# Patient Record
Sex: MALE | Race: WHITE | Employment: FULL TIME | URBAN - METROPOLITAN AREA
[De-identification: names, ages, dates, MRNs, and addresses within clinical notes are randomized per-mention and may not be internally consistent; named-entity substitution may affect disease eponyms.]

---

## 2019-03-18 ENCOUNTER — OFFICE VISIT (OUTPATIENT)
Dept: FAMILY MEDICINE CLINIC | Age: 54
End: 2019-03-18

## 2019-03-18 VITALS
DIASTOLIC BLOOD PRESSURE: 79 MMHG | BODY MASS INDEX: 31.79 KG/M2 | WEIGHT: 186.2 LBS | RESPIRATION RATE: 18 BRPM | HEIGHT: 64 IN | TEMPERATURE: 98 F | SYSTOLIC BLOOD PRESSURE: 126 MMHG | HEART RATE: 78 BPM | OXYGEN SATURATION: 100 %

## 2019-03-18 DIAGNOSIS — R06.83 SNORING: ICD-10-CM

## 2019-03-18 DIAGNOSIS — Z12.5 SCREENING FOR PROSTATE CANCER: ICD-10-CM

## 2019-03-18 DIAGNOSIS — E78.00 HYPERCHOLESTEROLEMIA: ICD-10-CM

## 2019-03-18 DIAGNOSIS — Z11.59 ENCOUNTER FOR HEPATITIS C SCREENING TEST FOR LOW RISK PATIENT: ICD-10-CM

## 2019-03-18 DIAGNOSIS — K63.5 POLYP OF COLON, UNSPECIFIED PART OF COLON, UNSPECIFIED TYPE: ICD-10-CM

## 2019-03-18 DIAGNOSIS — I10 ESSENTIAL HYPERTENSION: Primary | ICD-10-CM

## 2019-03-18 NOTE — PROGRESS NOTES
Carey Taylor is a 52 y.o. male     Fasting  Patient would like a cpe and referral to have a sleep study test performed. Patient states that he feels as tho he's not getting enough sleep at night. Chief Complaint   Patient presents with    New Patient    Establish Care    Hypertension       1. Have you been to the ER, urgent care clinic since your last visit? Hospitalized since your last visit? No  M  2. Have you seen or consulted any other health care providers outside of the [address] [place] [address] since your last visit? Include any pap smears or colon screening. No      Visit Vitals  Ht 5' 4\" (1.626 m)   Wt 186 lb 3.2 oz (84.5 kg)   BMI 31.96 kg/m²           Health Maintenance Due   Topic Date Due    Hepatitis C Screening  1965    DTaP/Tdap/Td series (1 - Tdap) 10/13/1984    Shingrix Vaccine Age 50> (1 of 2) 10/13/2013    Influenza Age 5 to Adult  08/01/2018         Medication Reconciliation completed, changes noted.   Please  Update medication list.

## 2019-03-18 NOTE — PATIENT INSTRUCTIONS
Blood pressure is very well controlled today, will check labs  History of hyperlipidemia, has historically been intolerant of statin medications, we will check labs and determine next course of action  Screening for hepatitis C and PSA and blood work  Patient has daytime fatigue, nonrestorative sleep, and snoring, I will make him a referral to sleep medicine for consultation and evaluation  History of colon polyps, up-to-date right now on colonoscopy, will be due again in 2023  I have encouraged the patient to follow a healthy diet and work to maintain a healthy weight, he is going to try to exercise more frequently.   Labs as per orders, we will see him back in 6 months or sooner as needed

## 2019-03-18 NOTE — PROGRESS NOTES
Family Medicine Initial Office Visit  Patient: Ana Cristina Maya  1965, 52 y.o., male  Encounter Date: 3/18/2019    ASSESSMENT & PLAN    ICD-10-CM ICD-9-CM    1. Essential hypertension I10 401.9 CBC W/O DIFF      METABOLIC PANEL, COMPREHENSIVE   2. Polyp of colon, unspecified part of colon, unspecified type K63.5 211.3    3. Hypercholesterolemia E78.00 272.0 CBC W/O DIFF      LIPID PANEL      METABOLIC PANEL, COMPREHENSIVE   4. Encounter for hepatitis C screening test for low risk patient Z11.59 V73.89 HCV AB W/RFLX TO TREVER   5. Snoring R06.83 786.09 SLEEP MEDICINE REFERRAL   6. Screening for prostate cancer Z12.5 V76.44 PSA SCREENING (SCREENING)     Orders Placed This Encounter    CBC W/O DIFF    LIPID PANEL    METABOLIC PANEL, COMPREHENSIVE    HCV AB W/RFLX TO TREVER    PSA SCREENING (SCREENING)    SLEEP MEDICINE REFERRAL     Referral Priority:   Routine     Referral Type:   Consultation     Referral Reason:   Specialty Services Required     Referred to Provider:   Shaheen Guevara MD     Requested Specialty:   Sleep Medicine     Number of Visits Requested:   1   Blood pressure is very well controlled today, will check labs  History of hyperlipidemia, has historically been intolerant of statin medications, we will check labs and determine next course of action  Screening for hepatitis C and PSA and blood work  Patient has daytime fatigue, nonrestorative sleep, and snoring, I will make him a referral to sleep medicine for consultation and evaluation  History of colon polyps, up-to-date right now on colonoscopy, will be due again in 2023  I have encouraged the patient to follow a healthy diet and work to maintain a healthy weight, he is going to try to exercise more frequently.   Labs as per orders, we will see him back in 6 months or sooner as needed    CHIEF COMPLAINT  Chief Complaint   Patient presents with    New Patient   [address]    Hypertension       SUBJECTIVE  Ana Cristina Maya is a 52 y.o. male presenting today for establishing care. Prior pt of Dr Albert Conklin, hasn't been seen in almost 5 years  Patient works as   Patient lives in a house with son (29). Has one other child who lives with her mother. Patient was last seen by primary care 4-5 years ago  Patient last saw a dentist about 4 months ago  Patient last had eye exam within the last year    Exercise: \"somewhat\" he enjoys back packing and hiking. No deliberate exercise on a weekly basis, but he has a very physically demanding jobs  Diet / Weight: not deliberately eating a healthy diet, reports \"I just eat what I feel like eating. \" He does eat vegetables and fruits. Weight is staying the same. Tobacco: quit in 1997, smoked for 30 years, 3 ppd  EtOH: no  Illicit Substances: no    Sexual Activity: not sexually active, does not desire std testing    The patient reports he has non-restorative sleep. When he wakes up he feels just as tired as when he went to bed. Feels his sleep can be restless, has chronic back pain but reports that he doesn't think back is his problem. He reports that some of his friends at work had great success with getting sleep difficulties identified on sleep studies. Patient does snore but does not wake from sleep gasping. He does wear a  because he grinds his teeth at night. Last colonoscopy done with [facility] in 2018, two sessile polyps, due again in 2023. Has not had lab work for a few years. Had Keratosis on hands and face--Follows with Dr Hao Donahue at [facility] for dermatology--just seen last week. 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 Suspicious for ca, Followed up with Dr Lucas May at [facility], was recommended to have a Whipple procedure, there was a change in plans, had cholecystectomy and continued surveillance. Patient has not continued surveillance.   Brother has a hx of Factor V leiden deficiency, patient has not personally had any blood clots    Review of Systems   Constitutional: Negative for chills and fever. HENT: Negative. Eyes: Negative for visual disturbance. Respiratory: Negative for shortness of breath. Cardiovascular: Negative for chest pain and leg swelling. Gastrointestinal: Negative for constipation, diarrhea, nausea and vomiting. Genitourinary: Negative for difficulty urinating. Musculoskeletal: Negative for arthralgias and myalgias. Skin: Negative for rash. Neurological: Negative for seizures, syncope and headaches. Psychiatric/Behavioral:        At Baseline, stable   All other systems reviewed and are negative. OBJECTIVE  Visit Vitals  /79 (BP 1 Location: Left arm, BP Patient Position: Sitting)   Pulse 78   Temp 98 °F (36.7 °C) (Oral)   Resp 18   Ht 5' 4\" (1.626 m)   Wt 186 lb 3.2 oz (84.5 kg)   SpO2 100%   BMI 31.96 kg/m²       Physical Exam   Constitutional: He is oriented to person, place, and time. He appears well-developed and well-nourished. No distress. NAD, Nontoxic, Appears Stated Age, overweight   HENT:   Head: Normocephalic and atraumatic. Mouth/Throat: Oropharynx is clear and moist.   Eyes: Conjunctivae and EOM are normal. Right eye exhibits no discharge. Left eye exhibits no discharge. No scleral icterus. Neck: Neck supple. No carotid bruit   Cardiovascular: Normal rate, regular rhythm and normal heart sounds. No murmur heard. Pulmonary/Chest: Effort normal and breath sounds normal. No stridor. No respiratory distress. He has no wheezes. He has no rales. Abdominal: Soft. Bowel sounds are normal. He exhibits no distension. There is no tenderness. Musculoskeletal: He exhibits no edema or tenderness. Neurological: He is alert and oriented to person, place, and time. Grossly intact CN   Skin: Skin is warm and dry. No rash noted. He is not diaphoretic.    Scabbing from recent derm procedures L nose and L hand   Psychiatric: He has a normal mood and affect. His behavior is normal.   Nursing note and vitals reviewed. No results found for any visits on 19. HISTORICAL  Reviewed and updated today, and as noted below:    Past Medical History:   Diagnosis Date    Hypercholesteremia     Hypercholesterolemia     Hypertension     Jaundice      Past Surgical History:   Procedure Laterality Date    HX CHOLECYSTECTOMY      HX CHOLECYSTECTOMY      HX COLONOSCOPY      Dr Leopoldo Balder      Family History   Problem Relation Age of Onset    Cancer Father         Non Hodgkins Lymphoma    Hypertension Brother     Elevated Lipids Brother     Dementia Mother     Other Brother         DVT - factor V leiden     Diabetes Neg Hx     Heart Disease Neg Hx      Social History     Tobacco Use   Smoking Status Former Smoker    Packs/day: 1.00    Years: 30.00    Pack years: 30.00    Last attempt to quit: 1998    Years since quittin.1   Smokeless Tobacco Never Used     Social History     Socioeconomic History    Marital status: SINGLE     Spouse name: Not on file    Number of children: Not on file    Years of education: Not on file    Highest education level: Not on file   Tobacco Use    Smoking status: Former Smoker     Packs/day: 1.00     Years: 30.00     Pack years: 30.00     Last attempt to quit: 1998     Years since quittin.1    Smokeless tobacco: Never Used   Substance and Sexual Activity    Alcohol use: Yes     Alcohol/week: 1.5 oz     Types: 3 Cans of beer per week    Drug use: No    Sexual activity: Not Currently     Allergies   Allergen Reactions    Niacin Other (comments)     Headache, foggy feeling    Crestor [Rosuvastatin] Other (comments)     headache      Lipitor [Atorvastatin] Other (comments)     fatigue    Pravastatin Myalgia       No visits with results within 3 Month(s) from this visit.    Latest known visit with results is:   Office Visit on 04/15/2015   Component Date Value Ref Range Status    VALID INTERNAL CONTROL POC 04/15/2015 Yes   Final    Group A Strep Ag 04/15/2015 Negative  Negative Final         Orville Eduardo MD  [facility]  03/18/19 8:35 AM    Portions of this note may have been populated using smart dictation software and may have \"sounds-like\" errors present.

## 2019-03-19 LAB
ALBUMIN SERPL-MCNC: 4.8 G/DL (ref 3.5–5.5)
ALBUMIN/GLOB SERPL: 1.7 {RATIO} (ref 1.2–2.2)
ALP SERPL-CCNC: 62 IU/L (ref 39–117)
ALT SERPL-CCNC: 27 IU/L (ref 0–44)
AST SERPL-CCNC: 26 IU/L (ref 0–40)
BILIRUB SERPL-MCNC: 1.2 MG/DL (ref 0–1.2)
BUN SERPL-MCNC: 13 MG/DL (ref 6–24)
BUN/CREAT SERPL: 12 (ref 9–20)
CALCIUM SERPL-MCNC: 9.9 MG/DL (ref 8.7–10.2)
CHLORIDE SERPL-SCNC: 105 MMOL/L (ref 96–106)
CHOLEST SERPL-MCNC: 310 MG/DL (ref 100–199)
CO2 SERPL-SCNC: 20 MMOL/L (ref 20–29)
CREAT SERPL-MCNC: 1.11 MG/DL (ref 0.76–1.27)
ERYTHROCYTE [DISTWIDTH] IN BLOOD BY AUTOMATED COUNT: 13.5 % (ref 12.3–15.4)
GLOBULIN SER CALC-MCNC: 2.8 G/DL (ref 1.5–4.5)
GLUCOSE SERPL-MCNC: 110 MG/DL (ref 65–99)
HCT VFR BLD AUTO: 49 % (ref 37.5–51)
HCV AB S/CO SERPL IA: <0.1 S/CO RATIO (ref 0–0.9)
HCV AB SERPL QL IA: NORMAL
HDLC SERPL-MCNC: 52 MG/DL
HGB BLD-MCNC: 16.7 G/DL (ref 13–17.7)
INTERPRETATION, 910389: NORMAL
LDLC SERPL CALC-MCNC: 231 MG/DL (ref 0–99)
MCH RBC QN AUTO: 29.6 PG (ref 26.6–33)
MCHC RBC AUTO-ENTMCNC: 34.1 G/DL (ref 31.5–35.7)
MCV RBC AUTO: 87 FL (ref 79–97)
PLATELET # BLD AUTO: 239 X10E3/UL (ref 150–379)
POTASSIUM SERPL-SCNC: 4.6 MMOL/L (ref 3.5–5.2)
PROT SERPL-MCNC: 7.6 G/DL (ref 6–8.5)
PSA SERPL-MCNC: 0.8 NG/ML (ref 0–4)
RBC # BLD AUTO: 5.64 X10E6/UL (ref 4.14–5.8)
SODIUM SERPL-SCNC: 144 MMOL/L (ref 134–144)
TRIGL SERPL-MCNC: 135 MG/DL (ref 0–149)
VLDLC SERPL CALC-MCNC: 27 MG/DL (ref 5–40)
WBC # BLD AUTO: 6.4 X10E3/UL (ref 3.4–10.8)

## 2019-03-25 NOTE — PROGRESS NOTES
Blood counts normal 
Cholesterol high, we should consider adding a cholesterol medication (one not tried before, or could refer to cardiology for lipid management clinic) Electrolytes, liver and kidney function ok Blood sugar elevated--should recheck in a few months HCV negative PSA OK and stable from last check

## 2019-06-25 ENCOUNTER — OFFICE VISIT (OUTPATIENT)
Dept: SLEEP MEDICINE | Age: 54
End: 2019-06-25

## 2019-06-25 ENCOUNTER — HOSPITAL ENCOUNTER (OUTPATIENT)
Dept: SLEEP MEDICINE | Age: 54
Discharge: HOME | End: 2019-06-25
Payer: COMMERCIAL

## 2019-06-25 VITALS
OXYGEN SATURATION: 96 % | BODY MASS INDEX: 32.21 KG/M2 | DIASTOLIC BLOOD PRESSURE: 76 MMHG | SYSTOLIC BLOOD PRESSURE: 125 MMHG | HEIGHT: 64 IN | HEART RATE: 74 BPM | WEIGHT: 188.7 LBS

## 2019-06-25 DIAGNOSIS — E66.9 OBESITY (BMI 30.0-34.9): ICD-10-CM

## 2019-06-25 DIAGNOSIS — G47.33 OBSTRUCTIVE SLEEP APNEA (ADULT) (PEDIATRIC): Primary | ICD-10-CM

## 2019-06-25 PROCEDURE — 95806 SLEEP STUDY UNATT&RESP EFFT: CPT | Performed by: INTERNAL MEDICINE

## 2019-06-25 RX ORDER — NAPROXEN SODIUM 220 MG
220 TABLET ORAL 2 TIMES DAILY WITH MEALS
COMMUNITY

## 2019-06-25 NOTE — PROGRESS NOTES
[address] [facility] [place]. [place], [address]  Tel.  [phone]  Fax. [address] [facility] [address]  [place], [address] [facility]  Tel.  [phone]  Fax. [phone] [address]  Tel.  [phone]  Fax. [phone]         Subjective:      Jenifer Gore is an 52 y.o. male referred for evaluation for a sleep disorder. He complains of snoring, frequent nighttime awakenings associated with excessive daytime sleepiness. Symptoms began a few years ago, gradually worsening since that time. He usually can fall asleep in 20 minutes. Family or house members note snoring. He denies falling asleep while at work, driving. Jenifer Gore   wake up frequently at night. He   bothered by waking up too early and left unable to get back to sleep. He actually sleeps about 2 hours at night and wakes up about   times during the night. He   work shifts: [employer] indicates he does get too little sleep at night. His bedtime is 2000. He awakens at 0400. He does not take naps. He takes   naps a week lasting  . He has the following observed behaviors: Loud snoring, Grinding teeth;  . Other remarks:   he feels he should feel more rested for the amount of sleep he gets. 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 He works long days. He likes to be active but likes to accomplish something when he exercises. He does not like the gym. He likes to hike. [place] Sleepiness Score: 13   which reflect mild daytime drowsiness. Allergies   Allergen Reactions    Niacin Other (comments)     Headache, foggy feeling    Crestor [Rosuvastatin] Other (comments)     headache      Lipitor [Atorvastatin] Other (comments)     fatigue    Pravastatin Myalgia         Current Outpatient Medications:     naproxen sodium (ALEVE) 220 mg tablet, Take 220 mg by mouth two (2) times daily (with meals). , Disp: , Rfl:     IBUPROFEN (ADVIL PO), Take  by mouth., Disp: , Rfl:    lovastatin (MEVACOR) 10 mg tablet, Take 1 Tab by mouth nightly. (Patient not taking: Reported on 3/18/2019), Disp: 30 Tab, Rfl: 3    omega-3 fatty acids-vitamin e (FISH OIL) 1,000 mg Cap, Take  by mouth daily. Taking 2 per day/2,000mg, Disp: , Rfl:      He  has a past medical history of High cholesterol, Hypercholesteremia, Hypercholesterolemia, Hypertension, and Jaundice. He  has a past surgical history that includes hx cholecystectomy; hx cholecystectomy; and hx colonoscopy (2018). He family history includes Cancer in his father; Dementia in his mother; Elevated Lipids in his brother; Hypertension in his brother; Other in his brother. He  reports that he quit smoking about 21 years ago. He has a 30.00 pack-year smoking history. He has never used smokeless tobacco. He reports that he drank about 1.5 oz of alcohol per week. He reports that he does not use drugs. Review of Systems:  Constitutional:  No significant weight loss or weight gain. Eyes:  No blurred vision. CVS:  No significant chest pain  Pulm:  No significant shortness of breath  GI:  No significant nausea or vomiting  :  No significant nocturia  Musculoskeletal:  No significant joint pain at night, occasional back pain awakens him. Sleeps elevated which helps  Skin:  No significant rashes  Neuro:  No significant dizziness   Psych:  No active mood issues    Sleep Review of Systems: notable for no difficulty falling asleep; +frequent awakenings at night; occasional dreaming noted; no nightmares ; no early morning headaches; + memory problems; no concentration issues; no history of any automobile or occupational accidents due to daytime drowsiness.       Objective:     Visit Vitals  /76 (BP 1 Location: Left arm)   Pulse 74   Ht 5' 4\" (1.626 m)   Wt 188 lb 11.2 oz (85.6 kg)   SpO2 96%   BMI 32.39 kg/m²         General:   Not in acute distress   Eyes:  Anicteric sclerae, no obvious strabismus   Nose:  No obvious nasal septum deviation Oropharynx:   Class 3 oropharyngeal outlet, thick tongue base, enlarged and boggy uvula, low-lying soft palate, narrow tonsilo-pharyngeal pilars   Tonsils:   tonsils are present and normal   Neck:   Neck circ. in \"inches\": 16; midline trachea   Chest/Lungs:  Equal lung expansion, clear on auscultation    CVS:  Normal rate, regular rhythm; no JVD   Skin:  Warm to touch; no obvious rashes   Neuro:  No focal deficits ; no obvious tremor    Psych:  Normal affect,  normal countenance;          Assessment:       ICD-10-CM ICD-9-CM    1. Obstructive sleep apnea (adult) (pediatric) G47.33 327.23 SLEEP STUDY UNATTENDED, 4 CHANNEL   2. Obesity (BMI 30.0-34. 9) E66.9 278.00          Plan:     * The patient currently has a Moderate Risk for having sleep apnea. STOP-BANG score 4.  * PSG was ordered for initial evaluation. I have reviewed the different types of sleep studies. Attended sleep studies and home sleep apnea tests. Home sleep testing tests only for the presence and severity of sleep apnea. he understands that if the HSAT does not provide reliable result(such as poor data/failed HSAT recording), he may have to repeat the HSAT or come in for an attended polysomnogram.     * He was provided information on sleep apnea including coresponding risk factors and the importance of proper treatment. * Counseling was provided regarding proper sleep hygiene and safe driving. *Treatment options for sleep apnea were reviewed. he is not against a trial of PAP if found to have significant sleep apnea. The treatment plan was reviewed with the patient in detail and reviewed with the patient and the lead technologist. he understands that the lead technologist will be calling him  with the results and assisting with the next step in the treatment plan as outlined today during the consultation with me. All of his questions were addressed. 2. Obesity - I have counseled the patient regarding the benefits of weight loss.       Thank you for allowing us to participate in your patient's medical care. We'll keep you updated on these investigations.   Electronically signed by    Ace Lira MD  Diplomate in Sleep Medicine  [facility]

## 2019-06-25 NOTE — PROGRESS NOTES
[address] [facility] [place]. [place], [address]  Tel.  [phone]  Fax. 100 [facility] [address]  [place], [address] [facility]  Tel.  [phone]  Fax. [phone] [address] [facility] [address]   Tel.  [phone]  Fax. [phone]       S>Keven Dominguez is a 52 y.o. male seen today to receive a home sleep testing unit (HST). · Patient was educated on proper hookup and operation of the HST. · Instruction forms and documentation were reviewed and signed. · The patient demonstrated good understanding of the HST. O>    Visit Vitals  /76 (BP 1 Location: Left arm)   Pulse 74   Ht 5' 4\" (1.626 m)   Wt 188 lb 11.2 oz (85.6 kg)   SpO2 96%   BMI 32.39 kg/m²    Neck circ. in \"inches\": 16    A>  1. Obstructive sleep apnea (adult) (pediatric)    2. Obesity (BMI 30.0-34. 9)          P>  · General information regarding operations and maintenance of the device was provided. · He was provided information on sleep apnea including coresponding risk factors and the importance of proper treatment. · Follow-up appointment was made to return the HST. He will be contacted once the results have been reviewed. · He was asked to contact our office for any problems regarding his home sleep test study.

## 2019-06-25 NOTE — PATIENT INSTRUCTIONS
[facility] [place]. [place], [address]  Tel.  [phone]  Fax. 100 [facility] 60  [place], [address] [facility]  Tel.  [phone]  Fax. [phone] [address]  Tel.  [phone]  Fax. [phone]     Sleep Apnea: After Your Visit  Your Care Instructions  Sleep apnea occurs when you frequently stop breathing for 10 seconds or longer during sleep. It can be mild to severe, based on the number of times per hour that you stop breathing or have slowed breathing. Blocked or narrowed airways in your nose, mouth, or throat can cause sleep apnea. Your airway can become blocked when your throat muscles and tongue relax during sleep. Sleep apnea is common, occurring in 1 out of 20 individuals. Individuals having any of the following characteristics should be evaluated and treated right away due to high risk and detrimental consequences from untreated sleep apnea:  1. Obesity  2. Congestive Heart failure  3. Atrial Fibrillation  4. Uncontrolled Hypertension  5. Type II Diabetes  6. Night-time Arrhythmias  7. Stroke  8. Pulmonary Hypertension  9. High-risk Driving Populations (pilots, truck drivers, etc.)  10. Patients Considering Weight-loss Surgery    How do you know you have sleep apnea? You probably have sleep apnea if you answer 'yes' to 3 or more of the following questions:  S - Have you been told that you Snore? T - Are you often Tired during the day? O - Has anyone Observed you stop breathing while sleeping? P- Do you have (or are being treated for) high blood Pressure? B - Are you obese (Body Mass Index > 35)? A - Is your Age 48years old or older? N - Is your Neck size greater than 16 inches? G - Are you male Gender? A sleep physician can prescribe a breathing device that prevents tissues in the throat from blocking your airway.  Or your doctor may recommend using a dental device (oral breathing device) to help keep your airway open. In some cases, surgery may be needed to remove enlarged tissues in the throat. Follow-up care is a key part of your treatment and safety. Be sure to make and go to all appointments, and call your doctor if you are having problems. It's also a good idea to know your test results and keep a list of the medicines you take. How can you care for yourself at home? · Lose weight, if needed. It may reduce the number of times you stop breathing or have slowed breathing. · Go to bed at the same time every night. · Sleep on your side. It may stop mild apnea. If you tend to roll onto your back, sew a pocket in the back of your pajama top. Put a tennis ball into the pocket, and stitch the pocket shut. This will help keep you from sleeping on your back. · Avoid alcohol and medicines such as sleeping pills and sedatives before bed. · Do not smoke. Smoking can make sleep apnea worse. If you need help quitting, talk to your doctor about stop-smoking programs and medicines. These can increase your chances of quitting for good. · Prop up the head of your bed 4 to 6 inches by putting bricks under the legs of the bed. · Treat breathing problems, such as a stuffy nose, caused by a cold or allergies. · Use a continuous positive airway pressure (CPAP) breathing machine if lifestyle changes do not help your apnea and your doctor recommends it. The machine keeps your airway from closing when you sleep. · If CPAP does not help you, ask your doctor whether you should try other breathing machines. A bilevel positive airway pressure machine has two types of air pressureâone for breathing in and one for breathing out. Another device raises or lowers air pressure as needed while you breathe. · If your nose feels dry or bleeds when using one of these machines, talk with your doctor about increasing moisture in the air. A humidifier may help.   · If your nose is runny or stuffy from using a breathing machine, talk with your doctor about using decongestants or a corticosteroid nasal spray. When should you call for help? Watch closely for changes in your health, and be sure to contact your doctor if:  · You still have sleep apnea even though you have made lifestyle changes. · You are thinking of trying a device such as CPAP. · You are having problems using a CPAP or similar machine. Where can you learn more? Go to MyEnergy. Enter W208 in the search box to learn more about \"Sleep Apnea: After Your Visit. \"   © 1464-6043 Healthwise, Incorporated. Care instructions adapted under license by [facility] (which disclaims liability or warranty for this information). This care instruction is for use with your licensed healthcare professional. If you have questions about a medical condition or this instruction, always ask your healthcare professional. Ashish Bending any warranty or liability for your use of this information. PROPER SLEEP HYGIENE    What to avoid  · Do not have drinks with caffeine, such as coffee or black tea, for 8 hours before bed. · Do not smoke or use other types of tobacco near bedtime. Nicotine is a stimulant and can keep you awake. · Avoid drinking alcohol late in the evening, because it can cause you to wake in the middle of the night. · Do not eat a big meal close to bedtime. If you are hungry, eat a light snack. · Do not drink a lot of water close to bedtime, because the need to urinate may wake you up during the night. · Do not read or watch TV in bed. Use the bed only for sleeping and sexual activity. What to try  · Go to bed at the same time every night, and wake up at the same time every morning. Do not take naps during the day. · Keep your bedroom quiet, dark, and cool. · Get regular exercise, but not within 3 to 4 hours of your bedtime. .  · Sleep on a comfortable pillow and mattress.   · If watching the clock makes you anxious, turn it facing away from you so you cannot see the time. · If you worry when you lie down, start a worry book. Well before bedtime, write down your worries, and then set the book and your concerns aside. · Try meditation or other relaxation techniques before you go to bed. · If you cannot fall asleep, get up and go to another room until you feel sleepy. Do something relaxing. Repeat your bedtime routine before you go to bed again. · Make your house quiet and calm about an hour before bedtime. Turn down the lights, turn off the TV, log off the computer, and turn down the volume on music. This can help you relax after a busy day. Drowsy Driving  The [address] [place] [address] Road Traffic Safety Administration cites drowsiness as a causing factor in more than 079,984 police reported crashes annually, resulting in 76,000 injuries and 1,500 deaths. Other surveys suggest 55% of people polled have driven while drowsy in the past year, 23% had fallen asleep but not crashed, 3% crashed, and 2% had and accident due to drowsy driving. Who is at risk? Young Drivers: One study of drowsy driving accidents states that 55% of the drivers were under 25 years. Of those, 75% were male. Shift Workers and Travelers: People who work overnight or travel across time zones frequently are at higher risk of experiencing Circadian Rhythm Disorders. They are trying to work and function when their body is programed to sleep. Sleep Deprived: Lack of sleep has a serious impact on your ability to pay attention or focus on a task. Consistently getting less than the average of 8 hours your body needs creates partial or cumulative sleep deprivation. Untreated Sleep Disorders: Sleep Apnea, Narcolepsy, R.L.S., and other sleep disorders (untreated) prevent a person from getting enough restful sleep. This leads to excessive daytime sleepiness and increases the risk for drowsy driving accidents by up to 7 times.   Medications / Alcohol: Even over the counter medications can cause drowsiness. Medications that impair a drivers attention should have a warning label. Alcohol naturally makes you sleepy and on its own can cause accidents. Combined with excessive drowsiness its effects are amplified. Signs of Drowsy Driving:   * You don't remember driving the last few miles   * You may drift out of your sotero   * You are unable to focus and your thoughts wander   * You may yawn more often than normal   * You have difficulty keeping your eyes open / nodding off   * Missing traffic signs, speeding, or tailgating  Prevention-   Good sleep hygiene, lifestyle and behavioral choices have the most impact on drowsy driving. There is no substitute for sleep and the average person requires 8 hours nightly. If you find yourself driving drowsy, stop and sleep. Consider the sleep hygiene tips provided during your visit as well. Medication Refill Policy: Refills for all medications require 1 week advance notice. Please have your pharmacy fax a refill request. We are unable to fax, or call in \"controled substance\" medications and you will need to pick these prescriptions up from our office. BugHerd Activation    Thank you for requesting access to [employer]. Please follow the instructions below to securely access and download your online medical record. BugHerd allows you to send messages to your doctor, view your test results, renew your prescriptions, schedule appointments, and more. How Do I Sign Up? 1. In your internet browser, go to [email or web address]. 2. Click on the First Time User? Click Here link in the Sign In box. You will see the New Member Sign Up page. 3. Enter your BugHerd Access Code exactly as it appears below. You will not need to use this code after youve completed the sign-up process. If you do not sign up before the expiration date, you must request a new code. BugHerd Access Code:  Activation code not generated  Current BugHerd Status: Active (This is the date your [employer] Machine access code will )    4. Enter the last four digits of your Social Security Number (xxxx) and Date of Birth (mm/dd/yyyy) as indicated and click Submit. You will be taken to the next sign-up page. 5. Create a [employer] ID. This will be your Culture Machine login ID and cannot be changed, so think of one that is secure and easy to remember. 6. Create a [employer] Machine password. You can change your password at any time. 7. Enter your Password Reset Question and Answer. This can be used at a later time if you forget your password. 8. Enter your e-mail address. You will receive e-mail notification when new information is available in [address] 9. Click Sign Up. You can now view and download portions of your medical record. 10. Click the Download Summary menu link to download a portable copy of your medical information. Additional Information    If you have questions, please call [phone]. Remember, [employer] Machine is NOT to be used for urgent needs. For medical emergencies, dial 911.

## 2019-06-28 ENCOUNTER — TELEPHONE (OUTPATIENT)
Dept: SLEEP MEDICINE | Age: 54
End: 2019-06-28

## 2019-06-28 DIAGNOSIS — G47.33 OBSTRUCTIVE SLEEP APNEA (ADULT) (PEDIATRIC): Primary | ICD-10-CM

## 2019-06-28 NOTE — TELEPHONE ENCOUNTER
Results of sleep study in [employer]  Lead tech to convey results to patient  HSAT results in [employer]. Test positive for mild sleep apnea. AHI 5.1/hour and lowest oxygen saturation was 79%. We had discussed treatment options at initial consultation. Based on the results of the home sleep apnea test, I believe a trial of APAP would be an effective mode of therapy to see if PA will reduce awakenings related to apneas and see if he feels more rested. APAP order attached. he should be seen in the sleep disorder center 4-6 weeks after initiating PAP therapy. The APAP will have modem access so he can call the sleep center if he  has questions/concerns regarding the initial PAP settings. Front staff to Order PAP and call patient and let them know which DME company they should be hearing from after results reviewed with lead support technologist.   Schedule for first adherence visit in 6 weeks.

## 2019-07-01 ENCOUNTER — DOCUMENTATION ONLY (OUTPATIENT)
Dept: SLEEP MEDICINE | Age: 54
End: 2019-07-01

## 2019-07-01 NOTE — PROGRESS NOTES
This note is being routed to [address]. Sleep Medicine consult note and sleep study report in patient's chart for review.     Thank you for the referral.

## 2019-07-02 ENCOUNTER — DOCUMENTATION ONLY (OUTPATIENT)
Dept: SLEEP MEDICINE | Age: 54
End: 2019-07-02

## 2019-07-02 NOTE — PROGRESS NOTES
Confirmed with patient he understood sleep study result interpretation posted in [employer]. He wanted me to go ahead and send APAP order. Order faxed, 1st adherence scheduled.

## 2019-07-08 ENCOUNTER — TELEPHONE (OUTPATIENT)
Dept: SLEEP MEDICINE | Age: 54
End: 2019-07-08

## 2019-07-08 NOTE — TELEPHONE ENCOUNTER
Patient called in reference to PAP order c/o Eitan High. Per [employer], no request for PA has been received to date. Explained DME process with patient. Will follow-up with Eitan High and let him know of status.

## 2019-07-08 NOTE — TELEPHONE ENCOUNTER
Per Shima Ocampo at [facility], awaiting for e-signature for drop ship from patient and PA request sent to [facility]ues[facility] on 7/3/19. Patient informed of status and he would like to wait until PA is in place prior to e-signing as he wants to make sure PAP is covered by plan. Recommended that he check back with them tomorrow.

## 2019-07-09 ENCOUNTER — DOCUMENTATION ONLY (OUTPATIENT)
Dept: SLEEP MEDICINE | Age: 54
End: 2019-07-09

## 2019-07-09 NOTE — PROGRESS NOTES
[address] to request prior authorization for APAP device code , spoke with Evelyn Iyer. call ref # [id number] stated No PA required for APAP device, also stated at one time PA was required but recently that has changed and it no longer required. Called [place] system and did thru their system and was also told No PA required for CPAP and APAP devices. Fax will be sent as further verification.

## 2019-07-09 NOTE — PROGRESS NOTES
[facility] again to double check No PA required for APAP device spoke with Hermelindo IVEY call ref# [id number] stated no prior auth required for Code  APAP machine.

## 2019-07-09 NOTE — PROGRESS NOTES
Returned patient called about obtaining a referral from insurance for CPAP device but appears Juany Ibarra has spoken with patient (please refer to Juany Ibarra documentation) if patient calls back

## 2019-07-18 ENCOUNTER — OFFICE VISIT (OUTPATIENT)
Dept: FAMILY MEDICINE CLINIC | Age: 54
End: 2019-07-18

## 2019-07-18 VITALS
HEIGHT: 64 IN | BODY MASS INDEX: 32.47 KG/M2 | RESPIRATION RATE: 18 BRPM | OXYGEN SATURATION: 99 % | SYSTOLIC BLOOD PRESSURE: 126 MMHG | HEART RATE: 70 BPM | TEMPERATURE: 98.4 F | DIASTOLIC BLOOD PRESSURE: 82 MMHG | WEIGHT: 190.2 LBS

## 2019-07-18 DIAGNOSIS — J31.0 RHINITIS, UNSPECIFIED TYPE: Primary | ICD-10-CM

## 2019-07-18 DIAGNOSIS — H61.22 IMPACTED CERUMEN OF LEFT EAR: ICD-10-CM

## 2019-07-18 RX ORDER — FLUTICASONE PROPIONATE 50 MCG
2 SPRAY, SUSPENSION (ML) NASAL DAILY
Qty: 1 BOTTLE | Refills: 1 | Status: SHIPPED | OUTPATIENT
Start: 2019-07-18

## 2019-07-18 RX ORDER — NEOMYCIN SULFATE, POLYMYXIN B SULFATE AND DEXAMETHASONE 3.5; 10000; 1 MG/ML; [USP'U]/ML; MG/ML
SUSPENSION/ DROPS OPHTHALMIC
Refills: 0 | COMMUNITY
Start: 2019-07-08 | End: 2020-06-29

## 2019-07-18 NOTE — PROGRESS NOTES
Family Medicine Acute Visit Progress Note  Patient: Radha Pulse  1965, 52 y.o., male  Encounter Date: 2019    ASSESSMENT & PLAN    ICD-10-CM ICD-9-CM    1. Rhinitis, unspecified type J31.0 472.0    2. Impacted cerumen of left ear H61.22 380.4 REMOVE IMPACTED EAR WAX       Orders Placed This Encounter    REMOVE IMPACTED EAR WAX    neomycin-polymyxin-dexamethasone (MAXITROL) ophthalmic suspension     Sig: INSTILL 1 DROP INTO LEFT EYE THREE TIMES DAILY FOR 5 DAYS     Refill:  0    KRILL OIL PO     Sig: Take  by mouth.  fluticasone propionate (FLONASE) 50 mcg/actuation nasal spray     Si Sprays by Both Nostrils route daily. Dispense:  1 Bottle     Refill:  1       Patient Instructions   The patient appears to be having some nasal rhinitis, it could be due to environmental or seasonal allergies it could be a resolving infection such as a viral infection, he does not appear to have a paresh sinus infection today which is excellent  I encouraged him to drink plenty of fluids and start using Flonase 2 sprays in each nostril once daily in the evening time  Okay to start CPAP when the machine is received in to see how you do, I did mention to him that it takes about 5 to 7 days may be even up to 15 days for the Flonase to take full effect so to keep using it. If new symptoms of infection occur such as sinus pressure or pain, ear pressure or pain or new fevers he should return to the office for reevaluation or give us a call so that we can advise him  He did have earwax impaction in the left ear, this was removed today with curettage and the patient tolerated this well with no side effects, there was no bleeding  Call with questions or concerns      CHIEF COMPLAINT  Chief Complaint   Patient presents with    Sinus Infection       Emily Mukherjee is a 52 y.o. male presenting today for sinus congestion. He reports his nose is running and it is really hard and crusty in the morning.  No facial pain or pressure. No ear pain, fullness, clicking, or popping, no fevers. No personal history of seasonal or evironmental allergies. Does work outside a lot with dust  No cough, no sob, no chest pain  Sometimes has glob of mucous in his throat. Ear feels clogged from wax, wonders if I can look in it  Review of Systems  A 12 point review of systems was negative except as noted here or in the HPI. OBJECTIVE  Visit Vitals  /82 (BP 1 Location: Left arm, BP Patient Position: Sitting)   Pulse 70   Temp 98.4 °F (36.9 °C) (Oral)   Resp 18   Ht 5' 4\" (1.626 m)   Wt 190 lb 3.2 oz (86.3 kg)   SpO2 99%   BMI 32.65 kg/m²       Physical Exam   Constitutional: He is oriented to person, place, and time. He appears well-developed and well-nourished. No distress. Nad, appears stated age, non toxic, obese   HENT:   Head: Normocephalic and atraumatic. Right Ear: External ear normal.   Left Ear: External ear normal.   Mouth/Throat: No oropharyngeal exudate. Throat clear but erythematous, no exudates or concretions, nares boggy and edematous  Initially firm dried ceruminosis noted in the left ear, patient with history of ceruminosis, tolerated removal procedure see procedure note below   Eyes: EOM are normal. Right eye exhibits no discharge. Left eye exhibits no discharge. No scleral icterus. Neck: Normal range of motion. Neck supple. Cardiovascular: Normal rate, regular rhythm and normal heart sounds. Exam reveals no gallop and no friction rub. No murmur heard. Pulmonary/Chest: Effort normal and breath sounds normal. No stridor. No respiratory distress. He has no wheezes. He has no rales. Abdominal: Soft. Bowel sounds are normal. He exhibits no distension. There is no tenderness. Musculoskeletal: He exhibits no edema. Lymphadenopathy:     He has cervical adenopathy. Neurological: He is alert and oriented to person, place, and time. Skin: Skin is warm and dry. No rash noted. He is not diaphoretic. Psychiatric: He has a normal mood and affect. His behavior is normal.   Nursing note and vitals reviewed. No results found for any visits on 07/18/19. HISTORICAL  PMH, PSH, FHX, SOCHX, ALLERGIES and MES were reviewed and updated today. Pedro Wiggins MD  [facility]  07/18/19 3:32 PM    Portions of this note may have been populated using smart dictation software and may have \"sounds-like\" errors present. Pt was counseled on risks, benefits and alternatives of treatment options. All questions were asked and answered and the patient was agreeable with the treatment plan as outlined. Subjective:     Radha Mckeon is a 52 y.o. male who presents for evaluation of a plugged ear. He has noticed left symptoms a few weeks ago. There is a prior history of cerumen impaction. Patient denies ear pain. Patient has hearing loss. Objective:     Visit Vitals  /82 (BP 1 Location: Left arm, BP Patient Position: Sitting)   Pulse 70   Temp 98.4 °F (36.9 °C) (Oral)   Resp 18   Ht 5' 4\" (1.626 m)   Wt 190 lb 3.2 oz (86.3 kg)   SpO2 99%   BMI 32.65 kg/m²       General: alert, cooperative, no distress   Right Ear: bilateral TM's and external ear canals normal.    Left Ear:  ceruminosis noted. After removal: bilateral TM's and external ear canals normal, cerumen removed. Oropharynx:   normal, moderate erythema and mucous membranes moist.   Neck:  supple, symmetrical, trachea midline and mild anterior cervical adenopathy. Assessment/Plan:     Cerumen Impaction, without otitis externa. 1. Cerumen removed by currettage. 2. Care instructions given. 3. Home treatment: none  4. Followup as needed. ICD-10-CM ICD-9-CM    1. Rhinitis, unspecified type J31.0 472.0    2. Impacted cerumen of left ear H61.22 380.4 REMOVE IMPACTED EAR WAX   .   Orders Placed This Encounter    REMOVE IMPACTED EAR WAX    neomycin-polymyxin-dexamethasone (MAXITROL) ophthalmic suspension     Sig: INSTILL 1 DROP INTO LEFT EYE THREE TIMES DAILY FOR 5 DAYS     Refill:  0    KRILL OIL PO     Sig: Take  by mouth.  fluticasone propionate (FLONASE) 50 mcg/actuation nasal spray     Si Sprays by Both Nostrils route daily.      Dispense:  1 Bottle     Refill:  1

## 2019-07-18 NOTE — PATIENT INSTRUCTIONS
The patient appears to be having some nasal rhinitis, it could be due to environmental or seasonal allergies it could be a resolving infection such as a viral infection, he does not appear to have a paresh sinus infection today which is excellent  I encouraged him to drink plenty of fluids and start using Flonase 2 sprays in each nostril once daily in the evening time  Okay to start CPAP when the machine is received in to see how you do, I did mention to him that it takes about 5 to 7 days may be even up to 15 days for the Flonase to take full effect so to keep using it.   If new symptoms of infection occur such as sinus pressure or pain, ear pressure or pain or new fevers he should return to the office for reevaluation or give us a call so that we can advise him  He did have earwax impaction in the left ear, this was removed today with curettage and the patient tolerated this well with no side effects, there was no bleeding  Call with questions or concerns

## 2019-07-18 NOTE — PROGRESS NOTES
Chief Complaint   Patient presents with    Sinus Infection     1. Have you been to the ER, urgent care clinic since your last visit? Hospitalized since your last visit? No    2. Have you seen or consulted any other health care providers outside of the [address] [place] [address] since your last visit? Include any pap smears or colon screening.  No

## 2019-07-22 ENCOUNTER — TELEPHONE (OUTPATIENT)
Dept: SLEEP MEDICINE | Age: 54
End: 2019-07-22

## 2019-07-22 DIAGNOSIS — G47.33 OBSTRUCTIVE SLEEP APNEA (ADULT) (PEDIATRIC): Primary | ICD-10-CM

## 2019-07-22 NOTE — TELEPHONE ENCOUNTER
Patient called in stating he received his machine and supplies on Friday but wants to switch to full face mask. He would like for us to call once we have sent the order off to [facility]. Please put order in.

## 2019-08-02 ENCOUNTER — DOCUMENTATION ONLY (OUTPATIENT)
Dept: SLEEP MEDICINE | Age: 54
End: 2019-08-02

## 2019-09-10 ENCOUNTER — OFFICE VISIT (OUTPATIENT)
Dept: SLEEP MEDICINE | Age: 54
End: 2019-09-10

## 2019-09-10 VITALS
SYSTOLIC BLOOD PRESSURE: 125 MMHG | HEART RATE: 71 BPM | HEIGHT: 64 IN | WEIGHT: 194 LBS | OXYGEN SATURATION: 97 % | DIASTOLIC BLOOD PRESSURE: 79 MMHG | BODY MASS INDEX: 33.12 KG/M2

## 2019-09-10 DIAGNOSIS — G47.33 OBSTRUCTIVE SLEEP APNEA (ADULT) (PEDIATRIC): Primary | ICD-10-CM

## 2019-09-10 NOTE — PROGRESS NOTES
[address] [place]. [place], [address]   Tel.  [phone]   Fax. 100 [facility] [address]   [place], [address]   Tel.  [phone]   Fax. [phone] [address] [facility] Claudia Siddiqui    Tel.  [phone]   Fax. [phone]       Subjective:   John Vilchis is a 52 y.o. male seen for a positive airway pressure follow-up, last seen by Dr. Domenic Moore on 6/25/2019, prior notes reviewed in detail. Home sleep test 6/2019 showed AHI of 5.1/hr with a lowest SaO2 of 79%. He is here for fist adherence. He reports no problems using the device and feels much more rested. The following concerns reviewed:    Drowsiness no Problems exhaling no   Snoring no Forget to put on no   Mask Comfortable yes Can't fall asleep no   Dry Mouth no Mask falls off no   Air Leaking no Frequent awakenings no       He admits that his sleep has improved on PAP therapy using nasal mask and heated tubing. Review of device download indicated:  Set pressure: 5-10 cmH2O; Average % Night in Large Leak: 0.1; % Used Days >= 4 hours: 93. Therapy Apnea Index averaged over PAP use: 0.8 /hr which reflects improved sleep breathing condition. Levasy Sleepiness Score: 7 and Modified F.O.S.Q. Score Total / 2: 18 which reflects improved sleep quality over therapy time. Allergies   Allergen Reactions    Niacin Other (comments)     Headache, foggy feeling    Crestor [Rosuvastatin] Other (comments)     headache      Lipitor [Atorvastatin] Other (comments)     fatigue    Pravastatin Myalgia       He has a current medication list which includes the following prescription(s): krill oil, naproxen sodium, ibuprofen, omega-3 fatty acids-vitamin e, neomycin-polymyxin-dexamethasone, fluticasone propionate, and lovastatin. Damaris Aguilar He  has a past medical history of High cholesterol, Hypercholesteremia, Hypercholesterolemia, Hypertension, and Jaundice.     Sleep Review of Systems: notable for Negative difficulty falling asleep; Positive awakenings at night; Negative early morning headaches; Negative memory problems; Negative concentration issues; Negative chest pain; Negative shortness of breath; Negative significant joint pain at night; Negative significant muscle pain at night; Negative rashes or itching; Negative heartburn; Negative significant mood issues; 0 afternoon naps per week;     Objective:     Visit Vitals  /79 (BP 1 Location: Left arm, BP Patient Position: Sitting)   Pulse 71   Ht 5' 4\" (1.626 m)   Wt 194 lb (88 kg)   SpO2 97%   BMI 33.30 kg/m²            General:   Alert, oriented, not in acute distress   Eyes:  Anicteric Sclerae; no obvious strabismus   Nose:  No obvious nasal septum deviation    Oropharynx:   Mallampati score 4, thick tongue base, uvula not seen due to low-lying soft palate, narrow tonsilo-pharyngeal pilars   Neck:   Midline trachea   Chest/Lungs:  Symmetrical lung expansion, clear lung fields on auscultation    CVS:  Normal rate, regular rhythm,  no JVD   Extremities:  No obvious rashes, no edema    Neuro:  No focal deficits; No obvious tremor    Psych:  Normal affect,  normal countenance       Assessment:       ICD-10-CM ICD-9-CM    1. Obstructive sleep apnea (adult) (pediatric) G47.33 327.23 AMB SUPPLY ORDER   2. Adult BMI 33.0-33.9 kg/sq m Z68.33 V85.33        AHI = 5.1(6/2019). On APAP :  5-10 cmH2O. He is compliant with PAP therapy and PAP continues to benefit patient and remains necessary for control of his sleep apnea. Plan:     Follow-up and Dispositions    · Return in about 1 year (around 9/10/2020). * Continue on current pressures    * He would like a prescription for a travel device as he may purchase one    Orders Placed This Encounter    AMB SUPPLY ORDER     Diagnosis: Obstructive Sleep Apnea ICD-10 Code (G47.33)    Positive Airway Pressure Therapy: Duration of need: 99 months.       Travel PAP Machine  APAP Minimum Pressure: 5 cmH2O, Maximum Pressure: 10  CmH2O. CPAP Set Pressure 7cmH2O    DANK Coleman; NPI: [id number]    Electronically signed. Date:- 09/10/19       * Counseling was provided regarding the importance of regular PAP use with emphasis on ensuring sufficient total sleep time, proper sleep hygiene, and safe driving. * Re-enforced proper and regular cleaning for the device. * He was asked to contact our office for any problems regarding PAP therapy. 2. Recommended a dedicated weight loss program through appropriate diet and exercise regimen as significant weight reduction has been shown to reduce severity of obstructive sleep apnea. Patient's phone number [phone] (home)  was reviewed and confirmed for accuracy. He gives permission for messages regarding results and appointments to be left at that number. DANK Coleman, [address] [place] [address]  Electronically signed.  09/10/19

## 2019-09-10 NOTE — PATIENT INSTRUCTIONS
[address] [facility] [place]. [place], [address]  Tel.  [phone]  Fax. [address] [facility]  [place], [address] [facility]  Tel.  [phone]  Fax. [phone] [address]  Tel.  [phone]  Fax. [phone]     Learning About CPAP for Sleep Apnea  What is CPAP? CPAP is a small machine that you use at home every night while you sleep. It increases air pressure in your throat to keep your airway open. When you have sleep apnea, this can help you sleep better so you feel much better. CPAP stands for \"continuous positive airway pressure. \"  The CPAP machine will have one of the following:  · A mask that covers your nose and mouth  · Prongs that fit into your nose  · A mask that covers your nose only, the most common type. This type is called NCPAP. The N stands for \"nasal.\"  Why is it done? CPAP is usually the best treatment for obstructive sleep apnea. It is the first treatment choice and the most widely used. Your doctor may suggest CPAP if you have:  · Moderate to severe sleep apnea. · Sleep apnea and coronary artery disease (CAD) or heart failure. How does it help? · CPAP can help you have more normal sleep, so you feel less sleepy and more alert during the daytime. · CPAP may help keep heart failure or other heart problems from getting worse. · NCPAP may help lower your blood pressure. · If you use CPAP, your bed partner may also sleep better because you are not snoring or restless. What are the side effects? Some people who use CPAP have:  · A dry or stuffy nose and a sore throat. · Irritated skin on the face. · Sore eyes. · Bloating. If you have any of these problems, work with your doctor to fix them. Here are some things you can try:  · Be sure the mask or nasal prongs fit well. · See if your doctor can adjust the pressure of your CPAP. · If your nose is dry, try a humidifier.   · If your nose is runny or stuffy, try decongestant medicine or a steroid nasal spray. If these things do not help, you might try a different type of machine. Some machines have air pressure that adjusts on its own. Others have air pressures that are different when you breathe in than when you breathe out. This may reduce discomfort caused by too much pressure in your nose. Where can you learn more? Go to [employer]  Enter Una Been in the search box to learn more about \"Learning About CPAP for Sleep Apnea. \"   © 7627-6963 Healthwise, Incorporated. Care instructions adapted under license by [facility] (which disclaims liability or warranty for this information). This care instruction is for use with your licensed healthcare professional. If you have questions about a medical condition or this instruction, always ask your healthcare professional. [address] any warranty or liability for your use of this information. Content Version: 2.4.42958; Last Revised: January 11, 2010  PROPER SLEEP HYGIENE    What to avoid  · Do not have drinks with caffeine, such as coffee or black tea, for 8 hours before bed. · Do not smoke or use other types of tobacco near bedtime. Nicotine is a stimulant and can keep you awake. · Avoid drinking alcohol late in the evening, because it can cause you to wake in the middle of the night. · Do not eat a big meal close to bedtime. If you are hungry, eat a light snack. · Do not drink a lot of water close to bedtime, because the need to urinate may wake you up during the night. · Do not read or watch TV in bed. Use the bed only for sleeping and sexual activity. What to try  · Go to bed at the same time every night, and wake up at the same time every morning. Do not take naps during the day. · Keep your bedroom quiet, dark, and cool. · Get regular exercise, but not within 3 to 4 hours of your bedtime. .  · Sleep on a comfortable pillow and mattress.   · If watching the clock makes you anxious, turn it facing away from you so you cannot see the time. · If you worry when you lie down, start a worry book. Well before bedtime, write down your worries, and then set the book and your concerns aside. · Try meditation or other relaxation techniques before you go to bed. · If you cannot fall asleep, get up and go to another room until you feel sleepy. Do something relaxing. Repeat your bedtime routine before you go to bed again. · Make your house quiet and calm about an hour before bedtime. Turn down the lights, turn off the TV, log off the computer, and turn down the volume on music. This can help you relax after a busy day. Drowsy Driving: The [employer] cites drowsiness as a causing factor in more than 754,456 police reported crashes annually, resulting in 76,000 injuries and 1,500 deaths. Other surveys suggest 55% of people polled have driven while drowsy in the past year, 23% had fallen asleep but not crashed, 3% crashed, and 2% had and accident due to drowsy driving. Who is at risk? Young Drivers: One study of drowsy driving accidents states that 55% of the drivers were under 25 years. Of those, 75% were male. Shift Workers and Travelers: People who work overnight or travel across time zones frequently are at higher risk of experiencing Circadian Rhythm Disorders. They are trying to work and function when their body is programed to sleep. Sleep Deprived: Lack of sleep has a serious impact on your ability to pay attention or focus on a task. Consistently getting less than the average of 8 hours your body needs creates partial or cumulative sleep deprivation. Untreated Sleep Disorders: Sleep Apnea, Narcolepsy, R.L.S., and other sleep disorders (untreated) prevent a person from getting enough restful sleep. This leads to excessive daytime sleepiness and increases the risk for drowsy driving accidents by up to 7 times.   Medications / Alcohol: Even over the counter medications can cause drowsiness. Medications that impair a drivers attention should have a warning label. Alcohol naturally makes you sleepy and on its own can cause accidents. Combined with excessive drowsiness its effects are amplified. Signs of Drowsy Driving:   * You don't remember driving the last few miles   * You may drift out of your sotero   * You are unable to focus and your thoughts wander   * You may yawn more often than normal   * You have difficulty keeping your eyes open / nodding off   * Missing traffic signs, speeding, or tailgating  Prevention-   Good sleep hygiene, lifestyle and behavioral choices have the most impact on drowsy driving. There is no substitute for sleep and the average person requires 8 hours nightly. If you find yourself driving drowsy, stop and sleep. Consider the sleep hygiene tips provided during your visit as well. Medication Refill Policy: Refills for all medications require 1 week advance notice. Please have your pharmacy fax a refill request. We are unable to fax, or call in \"controled substance\" medications and you will need to pick these prescriptions up from our office. [employer] Activation    Thank you for requesting access to [employer]. Please follow the instructions below to securely access and download your online medical record. [employer] allows you to send messages to your doctor, view your test results, renew your prescriptions, schedule appointments, and more. How Do I Sign Up? 1. In your internet browser, go to [email or web address] 2. Click on the First Time User? Click Here link in the Sign In box. You will see the New Member Sign Up page. 3. Enter your [employer] Access Code exactly as it appears below. You will not need to use this code after youve completed the sign-up process. If you do not sign up before the expiration date, you must request a new code. [employer] Access Code:  Activation code not generated  Current InStaff Status: Active (This is the date your InStaff access code will )    4. Enter the last four digits of your Social Security Number (xxxx) and Date of Birth (mm/dd/yyyy) as indicated and click Submit. You will be taken to the next sign-up page. 5. Create a uBankt ID. This will be your InStaff login ID and cannot be changed, so think of one that is secure and easy to remember. 6. Create a InStaff password. You can change your password at any time. 7. Enter your Password Reset Question and Answer. This can be used at a later time if you forget your password. 8. Enter your e-mail address. You will receive e-mail notification when new information is available in [address] 9. Click Sign Up. You can now view and download portions of your medical record. 10. Click the Download Summary menu link to download a portable copy of your medical information. Additional Information    If you have questions, please call [phone]. Remember, InStaff is NOT to be used for urgent needs. For medical emergencies, dial 911.

## 2019-10-11 ENCOUNTER — OFFICE VISIT (OUTPATIENT)
Dept: FAMILY MEDICINE CLINIC | Age: 54
End: 2019-10-11

## 2019-10-11 VITALS
SYSTOLIC BLOOD PRESSURE: 125 MMHG | BODY MASS INDEX: 33.19 KG/M2 | WEIGHT: 194.4 LBS | OXYGEN SATURATION: 100 % | TEMPERATURE: 97.6 F | HEART RATE: 68 BPM | RESPIRATION RATE: 18 BRPM | DIASTOLIC BLOOD PRESSURE: 89 MMHG | HEIGHT: 64 IN

## 2019-10-11 DIAGNOSIS — I10 ESSENTIAL HYPERTENSION: ICD-10-CM

## 2019-10-11 DIAGNOSIS — E78.00 HYPERCHOLESTEROLEMIA: Primary | ICD-10-CM

## 2019-10-11 DIAGNOSIS — R73.01 IFG (IMPAIRED FASTING GLUCOSE): ICD-10-CM

## 2019-10-11 RX ORDER — DESONIDE 0.5 MG/G
CREAM TOPICAL
Refills: 1 | COMMUNITY
Start: 2019-09-09

## 2019-10-11 RX ORDER — ATORVASTATIN CALCIUM 10 MG/1
10 TABLET, FILM COATED ORAL DAILY
Qty: 30 TAB | Refills: 3 | Status: SHIPPED | OUTPATIENT
Start: 2019-10-11 | End: 2020-06-08

## 2019-10-11 NOTE — PATIENT INSTRUCTIONS
With regards to hyperlipidemia the patient's lipid levels have been elevated, we will recheck them today and then per the [facility] guidelines we will go ahead and restart Lipitor at 10 mg dose every other day for a month and then every day thereafter if tolerating.   If he has side effects he should call me and we should get a CPK to make note of whether he is truly intolerant or just having side effects because this will guide our treatment based on the [facility] statin intolerance guidelines  His blood pressure is well controlled and we will continue current plan as is  He had impaired fasting glucose on last labs and so now we will do diabetes screening including an A1c [place] call with questions or concerns, follow-up in 6 months

## 2019-10-11 NOTE — PROGRESS NOTES
Family Medicine Follow-Up Progress Note  Patient: Jcarlos Pulse  1965, 52 y.o., male  Encounter Date: 10/11/2019    ASSESSMENT & PLAN    ICD-10-CM ICD-9-CM    1. Hypercholesterolemia E78.00 272.0 LIPID PANEL      METABOLIC PANEL, COMPREHENSIVE   2. Essential hypertension R30 821.1 METABOLIC PANEL, COMPREHENSIVE   3. IFG (impaired fasting glucose) R73.01 790.21 HEMOGLOBIN A1C WITH EAG      METABOLIC PANEL, COMPREHENSIVE       Orders Placed This Encounter    HEMOGLOBIN A1C WITH EAG    LIPID PANEL    METABOLIC PANEL, COMPREHENSIVE    desonide (TRIDESILON) 0.05 % cream     Sig: APPLY 1 APPLICATION TOPICAL TWICE A DAY,INSTR:TO AFFECTED AREA IN EAR     Refill:  1    atorvastatin (LIPITOR) 10 mg tablet     Sig: Take 1 Tab by mouth daily. Indications: high cholesterol and high triglycerides     Dispense:  30 Tab     Refill:  3    varicella-zoster (SHINGRIX ADJUVANT COMPONENT-PF) injection     Si Each by IntraMUSCular route once for 1 dose. Indications: Prevention of Shingles     Dispense:  1 Each     Refill:  1       Patient Instructions   With regards to hyperlipidemia the patient's lipid levels have been elevated, we will recheck them today and then per the [facility] guidelines we will go ahead and restart Lipitor at 10 mg dose every other day for a month and then every day thereafter if tolerating.   If he has side effects he should call me and we should get a CPK to make note of whether he is truly intolerant or just having side effects because this will guide our treatment based on the [facility] statin intolerance guidelines  His blood pressure is well controlled and we will continue current plan as is  He had impaired fasting glucose on last labs and so now we will do diabetes screening including an A1c [place] call with questions or concerns, follow-up in 6 months      CHIEF COMPLAINT  Chief Complaint   Patient presents with    Cholesterol Problem       SUBJECTIVE  Jcarlos Pulse is a 52 y.o. male presenting today for here today for follow-up. He has been intolerant of 3 statin medications in the past and also niacin. He reports that the side effect with Lipitor was fatigue. He took the medication for less than a year. He is aware that his cholesterol is high and he does want to do sign to modify it. He is trying to eat a healthy diet and exercise, he is going to the mountains to go hiking later today in fact. He would be willing to retry some medications if that were the recommendation. I reviewed the ACC guidelines with him today  Otherwise doing well today, no complaints. Denies nausea and vomiting, diarrhea and constipation, fevers and chills, chest pain or shortness of breath. Denies headaches and vision changes, falling down and passing out. Review of Systems  A 12 point review of systems was negative except as noted here or in the HPI. OBJECTIVE  Visit Vitals  /89 (BP 1 Location: Left arm, BP Patient Position: Sitting)   Pulse 68   Temp 97.6 °F (36.4 °C) (Oral)   Resp 18   Ht 5' 4\" (1.626 m)   Wt 194 lb 6.4 oz (88.2 kg)   SpO2 100%   BMI 33.37 kg/m²       Physical Exam   Constitutional: He is oriented to person, place, and time. He appears well-developed and well-nourished. No distress. NAD, Nontoxic, Appears Stated Age, overweight   HENT:   Head: Normocephalic and atraumatic. Mouth/Throat: Oropharynx is clear and moist.   Eyes: Conjunctivae and EOM are normal. Right eye exhibits no discharge. Left eye exhibits no discharge. No scleral icterus. Neck: Neck supple. No carotid bruit   Cardiovascular: Normal rate, regular rhythm and normal heart sounds. No murmur heard. Pulmonary/Chest: Effort normal and breath sounds normal. No stridor. No respiratory distress. He has no wheezes. He has no rales. Abdominal: Soft. Bowel sounds are normal. He exhibits no distension. There is no tenderness. Musculoskeletal: He exhibits no edema or tenderness.    Neurological: He is alert and oriented to person, place, and time. Grossly intact CN   Skin: Skin is warm and dry. No rash noted. He is not diaphoretic. Erythematous macules dorsum of L hand   Psychiatric: He has a normal mood and affect. His behavior is normal.   Nursing note and vitals reviewed. No results found for any visits on 10/11/19.     HISTORICAL  Reviewed and updated today, and as noted below:    Past Medical History:   Diagnosis Date    High cholesterol     Hypercholesteremia     Hypercholesterolemia     Hypertension     Jaundice      Past Surgical History:   Procedure Laterality Date    HX CHOLECYSTECTOMY      HX CHOLECYSTECTOMY      HX COLONOSCOPY      Dr Suzan Curry      Family History   Problem Relation Age of Onset    Cancer Father         Non Hodgkins Lymphoma    Hypertension Brother     Elevated Lipids Brother     Dementia Mother     Other Brother         DVT - factor V leiden     Diabetes Neg Hx     Heart Disease Neg Hx      Social History     Tobacco Use   Smoking Status Former Smoker    Packs/day: 1.00    Years: 30.00    Pack years: 30.00    Last attempt to quit: 1998    Years since quittin.7   Smokeless Tobacco Never Used     Social History     Socioeconomic History    Marital status: SINGLE     Spouse name: Not on file    Number of children: Not on file    Years of education: Not on file    Highest education level: Not on file   Tobacco Use    Smoking status: Former Smoker     Packs/day: 1.00     Years: 30.00     Pack years: 30.00     Last attempt to quit: 1998     Years since quittin.7    Smokeless tobacco: Never Used   Substance and Sexual Activity    Alcohol use: Not Currently     Alcohol/week: 2.5 standard drinks     Types: 3 Cans of beer per week    Drug use: No    Sexual activity: Not Currently   Other Topics Concern     Allergies   Allergen Reactions    Niacin Other (comments)     Headache, foggy feeling    Crestor [Rosuvastatin] Other (comments) headache      Lipitor [Atorvastatin] Other (comments)     fatigue    Pravastatin Myalgia       No visits with results within 3 Month(s) from this visit. Latest known visit with results is:   Office Visit on 03/18/2019   Component Date Value Ref Range Status    WBC 03/18/2019 6.4  3.4 - 10.8 x10E3/uL Final    RBC 03/18/2019 5.64  4.14 - 5.80 x10E6/uL Final    HGB 03/18/2019 16.7  13.0 - 17.7 g/dL Final    HCT 03/18/2019 49.0  37.5 - 51.0 % Final    MCV 03/18/2019 87  79 - 97 fL Final    MCH 03/18/2019 29.6  26.6 - 33.0 pg Final    MCHC 03/18/2019 34.1  31.5 - 35.7 g/dL Final    RDW 03/18/2019 13.5  12.3 - 15.4 % Final    PLATELET 16/65/5301 961  150 - 379 x10E3/uL Final    Cholesterol, total 03/18/2019 310* 100 - 199 mg/dL Final    Triglyceride 03/18/2019 135  0 - 149 mg/dL Final    HDL Cholesterol 03/18/2019 52  >39 mg/dL Final    VLDL, calculated 03/18/2019 27  5 - 40 mg/dL Final    LDL, calculated 03/18/2019 231* 0 - 99 mg/dL Final    Glucose 03/18/2019 110* 65 - 99 mg/dL Final    BUN 03/18/2019 13  6 - 24 mg/dL Final    Creatinine 03/18/2019 1.11  0.76 - 1.27 mg/dL Final    GFR est non-AA 03/18/2019 74  >59 mL/min/1.73 Final    GFR est AA 03/18/2019 86  >59 mL/min/1.73 Final    BUN/Creatinine ratio 03/18/2019 12  9 - 20 Final    Sodium 03/18/2019 144  134 - 144 mmol/L Final    Potassium 03/18/2019 4.6  3.5 - 5.2 mmol/L Final    Chloride 03/18/2019 105  96 - 106 mmol/L Final    CO2 03/18/2019 20  20 - 29 mmol/L Final    Calcium 03/18/2019 9.9  8.7 - 10.2 mg/dL Final    Protein, total 03/18/2019 7.6  6.0 - 8.5 g/dL Final    Albumin 03/18/2019 4.8  3.5 - 5.5 g/dL Final    GLOBULIN, TOTAL 03/18/2019 2.8  1.5 - 4.5 g/dL Final    A-G Ratio 03/18/2019 1.7  1.2 - 2.2 Final    Bilirubin, total 03/18/2019 1.2  0.0 - 1.2 mg/dL Final    Alk.  phosphatase 03/18/2019 62  39 - 117 IU/L Final    AST (SGOT) 03/18/2019 26  0 - 40 IU/L Final    ALT (SGPT) 03/18/2019 27  0 - 44 IU/L Final    HCV Ab 03/18/2019 <0.1  0.0 - 0.9 s/co ratio Final    Prostate Specific Ag 03/18/2019 0.8  0.0 - 4.0 ng/mL Final    Comment: Roche ECLIA methodology. According to the American Urological Association, Serum PSA should  decrease and remain at undetectable levels after radical  prostatectomy. The AUA defines biochemical recurrence as an initial  PSA value 0.2 ng/mL or greater followed by a subsequent confirmatory  PSA value 0.2 ng/mL or greater. Values obtained with different assay methods or kits cannot be used  interchangeably. Results cannot be interpreted as absolute evidence  of the presence or absence of malignant disease.  HCV Interpretation 03/18/2019 Comment   Final    Comment: Negative  Not infected with HCV, unless recent infection is suspected or other  evidence exists to indicate HCV infection.  INTERPRETATION 03/18/2019 Note   Final    Supplemental report is available. Dixie Palmer MD  [facility]  10/11/19 8:31 AM    Portions of this note may have been populated using smart dictation software and may have \"sounds-like\" errors present. Pt was counseled on risks, benefits and alternatives of treatment options. All questions were asked and answered and the patient was agreeable with the treatment plan as outlined.

## 2019-10-12 LAB
ALBUMIN SERPL-MCNC: 4.5 G/DL (ref 3.5–5.5)
ALBUMIN/GLOB SERPL: 1.7 {RATIO} (ref 1.2–2.2)
ALP SERPL-CCNC: 66 IU/L (ref 39–117)
ALT SERPL-CCNC: 35 IU/L (ref 0–44)
AST SERPL-CCNC: 22 IU/L (ref 0–40)
BILIRUB SERPL-MCNC: 0.9 MG/DL (ref 0–1.2)
BUN SERPL-MCNC: 11 MG/DL (ref 6–24)
BUN/CREAT SERPL: 11 (ref 9–20)
CALCIUM SERPL-MCNC: 9.8 MG/DL (ref 8.7–10.2)
CHLORIDE SERPL-SCNC: 101 MMOL/L (ref 96–106)
CHOLEST SERPL-MCNC: 246 MG/DL (ref 100–199)
CO2 SERPL-SCNC: 23 MMOL/L (ref 20–29)
CREAT SERPL-MCNC: 1 MG/DL (ref 0.76–1.27)
EST. AVERAGE GLUCOSE BLD GHB EST-MCNC: 120 MG/DL
GLOBULIN SER CALC-MCNC: 2.7 G/DL (ref 1.5–4.5)
GLUCOSE SERPL-MCNC: 106 MG/DL (ref 65–99)
HBA1C MFR BLD: 5.8 % (ref 4.8–5.6)
HDLC SERPL-MCNC: 45 MG/DL
INTERPRETATION, 910389: NORMAL
LDLC SERPL CALC-MCNC: 166 MG/DL (ref 0–99)
POTASSIUM SERPL-SCNC: 4.8 MMOL/L (ref 3.5–5.2)
PROT SERPL-MCNC: 7.2 G/DL (ref 6–8.5)
SODIUM SERPL-SCNC: 142 MMOL/L (ref 134–144)
TRIGL SERPL-MCNC: 175 MG/DL (ref 0–149)
VLDLC SERPL CALC-MCNC: 35 MG/DL (ref 5–40)

## 2019-10-16 NOTE — PROGRESS NOTES
A1c in the prediabetic range  Cholesterol is elevated however it is improved from 7 months ago, keep up the good work  Electrolytes, kidneys and liver function are all normal

## 2020-01-10 ENCOUNTER — TELEPHONE (OUTPATIENT)
Dept: FAMILY MEDICINE CLINIC | Age: 55
End: 2020-01-10

## 2020-01-10 NOTE — TELEPHONE ENCOUNTER
Pt called to cancel same day appointment for this morning for abdominal pain due to not being able to make it here by 10 am.    Pt states pain has been going on for about a week, denies nausea, vomiting, bowel or bladder symptoms, noting pain at a 2 out of 10 now but has been 8-9 at times and is in the center of his abdomen and feels like he's being punched in the stomach if he pushes in on abdomen. Pt also denies fever, but requests call from nurse to advise what Dr. Jovan Ernst recommends and if he can come in next week.   Rupert

## 2020-06-08 ENCOUNTER — VIRTUAL VISIT (OUTPATIENT)
Dept: FAMILY MEDICINE CLINIC | Age: 55
End: 2020-06-08

## 2020-06-08 ENCOUNTER — TELEPHONE (OUTPATIENT)
Dept: FAMILY MEDICINE CLINIC | Age: 55
End: 2020-06-08

## 2020-06-08 DIAGNOSIS — E78.00 HYPERCHOLESTEROLEMIA: ICD-10-CM

## 2020-06-08 DIAGNOSIS — I10 ESSENTIAL HYPERTENSION: Primary | ICD-10-CM

## 2020-06-08 DIAGNOSIS — R73.01 IFG (IMPAIRED FASTING GLUCOSE): ICD-10-CM

## 2020-06-08 DIAGNOSIS — Z12.5 SCREENING FOR PROSTATE CANCER: ICD-10-CM

## 2020-06-08 DIAGNOSIS — Z78.9 STATIN INTOLERANCE: ICD-10-CM

## 2020-06-08 NOTE — PROGRESS NOTES
Chief Complaint   Patient presents with    Cholesterol Problem     dicuss being off the medication     Labs     discuss having labs      Pt is having virtual appointment at home in [place], [place].

## 2020-06-08 NOTE — TELEPHONE ENCOUNTER
Pt calling to request Dr. Vaughn Nurse please change his lab forms to [facility] and he will go on Monday to have drawn at [facility] location. Please call pt at [phone] to advise.  Rupert

## 2020-06-08 NOTE — PROGRESS NOTES
Eunice Rivera is a 62 y.o. male evaluated via audio only technology on 6/8/2020. Consent: He and/or his health care decision maker is aware that he may receive a bill for this audio only encounter, depending on his insurance coverage, and has provided verbal consent to proceed: Yes    I communicated with the patient and/or health care decision maker about the nature and details of the following:  Assessment & Plan:   Diagnoses and all orders for this visit:    1. Essential hypertension  -     CBC W/O DIFF; Future  -     METABOLIC PANEL, COMPREHENSIVE; Future    2. Hypercholesterolemia  -     LIPID PANEL; Future    3. IFG (impaired fasting glucose)  -     CBC W/O DIFF; Future  -     HEMOGLOBIN A1C WITH EAG; Future    4. Screening for prostate cancer  -     PSA SCREENING (SCREENING); Future    5. Statin intolerance    labs per orders  Continue to eat a healthy diet  Continue to exercise  Check ascvd risk after labs returned  Given lab telephone number  Pt agrees with plan  Statin intolerant--consider fibrate (failed 3 statins and niacin in the past)    12  Subjective:   Eunice Rivera is a 62 y.o. male who was seen for Cholesterol Problem (dicuss being off the medication ) and Labs (discuss having labs )    Patient reports he's doing well recently. He thought he was having some issues with his CPAP machine--he started with the nasal cushion but he also was trying the face mask and he reports to me that with the nasal cushion he's also breathing through his mouth and it didn't seem to be helping as much, when he went back to the full mask it's doing a lot better, feeling more rested now that he switched. HLD: he went ahead and stopped the cholesterol medication because he reports his hands were aching and his hand aching got better    Reports his back pain is doing ok    He reports he had a stomach ache January-February and he went and saw Dr Bear Felix was bloodwork and it self resolved.  No sequela  Prior to Admission medications    Medication Sig Start Date End Date Taking? Authorizing Provider   neomycin-polymyxin-dexamethasone (MAXITROL) ophthalmic suspension INSTILL 1 DROP INTO LEFT EYE THREE TIMES DAILY FOR 5 DAYS 7/8/19  Yes Provider, Historical   KRILL OIL PO Take  by mouth. Yes Provider, Historical   naproxen sodium (ALEVE) 220 mg tablet Take 220 mg by mouth two (2) times daily (with meals). Yes Provider, Historical   IBUPROFEN (ADVIL PO) Take  by mouth. Yes Provider, Historical   omega-3 fatty acids-vitamin e (FISH OIL) 1,000 mg Cap Take  by mouth daily. Taking 2 per day/2,000mg 9/17/10  Yes Paulo Rojas MD   desonide (TRIDESILON) 0.05 % cream APPLY 1 APPLICATION TOPICAL TWICE A DAY,INSTR:TO AFFECTED AREA IN EAR 9/9/19   Provider, Historical   atorvastatin (LIPITOR) 10 mg tablet Take 1 Tab by mouth daily. Indications: high cholesterol and high triglycerides 10/11/19 6/8/20  Nash Benitez MD   fluticasone propionate (FLONASE) 50 mcg/actuation nasal spray 2 Sprays by Both Nostrils route daily.  7/18/19   Nash Benitez MD     Allergies   Allergen Reactions    Niacin Other (comments)     Headache, foggy feeling    Crestor [Rosuvastatin] Other (comments)     headache      Lipitor [Atorvastatin] Other (comments)     fatigue    Pravastatin Myalgia       Past Medical History:   Diagnosis Date    High cholesterol     Hypercholesteremia     Hypercholesterolemia     Hypertension     Jaundice      Past Surgical History:   Procedure Laterality Date    HX CHOLECYSTECTOMY      HX CHOLECYSTECTOMY      HX COLONOSCOPY  2018    Dr Alberto Cuello      Family History   Problem Relation Age of Onset    Cancer Father         Non Hodgkins Lymphoma    Hypertension Brother     Elevated Lipids Brother     Dementia Mother     Other Brother         DVT - factor V leiden     Diabetes Neg Hx     Heart Disease Neg Hx      Social History     Tobacco Use    Smoking status: Former Smoker     Packs/day: 1.00     Years: 30.00     Pack years: 30.00     Last attempt to quit: 1998     Years since quittin.3    Smokeless tobacco: Never Used   Substance Use Topics    Alcohol use: Not Currently     Alcohol/week: 2.5 standard drinks     Types: 3 Cans of beer per week       ROS   A 12 point review of systems was negative except as noted here or in the HPI. I affirm this is a Patient-Initiated Episode with a Patient who has not had a related appointment within my department in the past 7 days or scheduled within the next 24 hours.     Total Time: minutes: 11-20 minutes    Note: not billable if this call serves to triage the patient into an appointment for the relevant concern      Huan Gonzales MD

## 2020-06-09 DIAGNOSIS — Z78.9 STATIN INTOLERANCE: ICD-10-CM

## 2020-06-09 DIAGNOSIS — R73.01 IFG (IMPAIRED FASTING GLUCOSE): ICD-10-CM

## 2020-06-09 DIAGNOSIS — E78.00 HYPERCHOLESTEROLEMIA: ICD-10-CM

## 2020-06-09 DIAGNOSIS — I10 ESSENTIAL HYPERTENSION: ICD-10-CM

## 2020-06-09 DIAGNOSIS — Z12.5 SCREENING FOR PROSTATE CANCER: ICD-10-CM

## 2020-06-10 NOTE — TELEPHONE ENCOUNTER
Called pt, and left a voice message, advising his lab slips have been faxed to [employer] requested. Advised pt to call office back with questions or concerns.

## 2020-06-16 LAB
ALBUMIN SERPL-MCNC: 4.5 G/DL (ref 3.8–4.9)
ALBUMIN/GLOB SERPL: 1.9 {RATIO} (ref 1.2–2.2)
ALP SERPL-CCNC: 59 IU/L (ref 39–117)
ALT SERPL-CCNC: 30 IU/L (ref 0–44)
AST SERPL-CCNC: 23 IU/L (ref 0–40)
BILIRUB SERPL-MCNC: 1.2 MG/DL (ref 0–1.2)
BUN SERPL-MCNC: 14 MG/DL (ref 6–24)
BUN/CREAT SERPL: 13 (ref 9–20)
CALCIUM SERPL-MCNC: 9.6 MG/DL (ref 8.7–10.2)
CHLORIDE SERPL-SCNC: 104 MMOL/L (ref 96–106)
CHOLEST SERPL-MCNC: 273 MG/DL (ref 100–199)
CO2 SERPL-SCNC: 21 MMOL/L (ref 20–29)
COMMENT, 011824: ABNORMAL
CREAT SERPL-MCNC: 1.1 MG/DL (ref 0.76–1.27)
ERYTHROCYTE [DISTWIDTH] IN BLOOD BY AUTOMATED COUNT: 13.2 % (ref 11.6–15.4)
EST. AVERAGE GLUCOSE BLD GHB EST-MCNC: 123 MG/DL
GLOBULIN SER CALC-MCNC: 2.4 G/DL (ref 1.5–4.5)
GLUCOSE SERPL-MCNC: 104 MG/DL (ref 65–99)
HBA1C MFR BLD: 5.9 % (ref 4.8–5.6)
HCT VFR BLD AUTO: 50.3 % (ref 37.5–51)
HDLC SERPL-MCNC: 40 MG/DL
HGB BLD-MCNC: 16.5 G/DL (ref 13–17.7)
INTERPRETATION, 910389: NORMAL
LDLC SERPL CALC-MCNC: 203 MG/DL (ref 0–99)
MCH RBC QN AUTO: 28.5 PG (ref 26.6–33)
MCHC RBC AUTO-ENTMCNC: 32.8 G/DL (ref 31.5–35.7)
MCV RBC AUTO: 87 FL (ref 79–97)
PLATELET # BLD AUTO: 251 X10E3/UL (ref 150–450)
POTASSIUM SERPL-SCNC: 4.5 MMOL/L (ref 3.5–5.2)
PROT SERPL-MCNC: 6.9 G/DL (ref 6–8.5)
PSA SERPL-MCNC: 0.9 NG/ML (ref 0–4)
RBC # BLD AUTO: 5.78 X10E6/UL (ref 4.14–5.8)
SODIUM SERPL-SCNC: 139 MMOL/L (ref 134–144)
TRIGL SERPL-MCNC: 150 MG/DL (ref 0–149)
VLDLC SERPL CALC-MCNC: 30 MG/DL (ref 5–40)
WBC # BLD AUTO: 6.2 X10E3/UL (ref 3.4–10.8)

## 2020-06-16 RX ORDER — FENOFIBRATE 48 MG/1
48 TABLET, COATED ORAL DAILY
Qty: 90 TAB | Refills: 0 | Status: SHIPPED | OUTPATIENT
Start: 2020-06-16 | End: 2020-06-30 | Stop reason: RX

## 2020-06-16 NOTE — PROGRESS NOTES
Blood count normal  PSA screening normal  A1c stable in prediabetes range  Cholesterol is worse from last check, we should add a medication--if not tolerated I would request that you check in with one of our cardiology team members for lipid management with other agents  FENOFIBRATE low dose sent to your pharmacy

## 2020-06-17 ENCOUNTER — TELEPHONE (OUTPATIENT)
Dept: CARDIOLOGY CLINIC | Age: 55
End: 2020-06-17

## 2020-06-24 ENCOUNTER — TELEPHONE (OUTPATIENT)
Dept: FAMILY MEDICINE CLINIC | Age: 55
End: 2020-06-24

## 2020-06-24 DIAGNOSIS — Z78.9 STATIN INTOLERANCE: ICD-10-CM

## 2020-06-24 DIAGNOSIS — E78.00 HYPERCHOLESTEROLEMIA: Primary | ICD-10-CM

## 2020-06-24 NOTE — TELEPHONE ENCOUNTER
Prior Authorization for pt's Fenofibrate has been completed on cover my meds. Awaiting response to be faxed with in 48-72 hours.

## 2020-06-29 ENCOUNTER — OFFICE VISIT (OUTPATIENT)
Dept: CARDIOLOGY CLINIC | Age: 55
End: 2020-06-29

## 2020-06-29 VITALS
OXYGEN SATURATION: 96 % | BODY MASS INDEX: 33.29 KG/M2 | WEIGHT: 195 LBS | HEART RATE: 65 BPM | SYSTOLIC BLOOD PRESSURE: 132 MMHG | DIASTOLIC BLOOD PRESSURE: 82 MMHG | HEIGHT: 64 IN

## 2020-06-29 DIAGNOSIS — I10 HYPERTENSION, UNSPECIFIED TYPE: ICD-10-CM

## 2020-06-29 DIAGNOSIS — E78.5 DYSLIPIDEMIA: Primary | ICD-10-CM

## 2020-06-29 RX ORDER — EZETIMIBE 10 MG/1
10 TABLET ORAL DAILY
Qty: 90 TAB | Refills: 3 | Status: SHIPPED | OUTPATIENT
Start: 2020-06-29 | End: 2021-07-07 | Stop reason: SDUPTHER

## 2020-06-29 NOTE — PROGRESS NOTES
Celia Kitchen MD. [facility] [place]              Patient: Jose Daniel Pereira  : 1965      Today's Date: 2020            HISTORY OF PRESENT ILLNESS:     History of Present Illness:  He comes in at Dr. Ramu Erwin suggestion to treat his dyslipidemia. He has myalgias from statins over the year - has tried crestor, lipitor, and pravastatin and all caused pain. Has been prescribed fibrate but not started yet. Denies any problems with stroke or MI. Has some palpitations occasionally - brief. Has some mild ankle swelling at times. No exertional CP. He feels well. He denies heart disease in his family. PAST MEDICAL HISTORY:     Past Medical History:   Diagnosis Date    High cholesterol     Hypercholesterolemia     Hypertension     Jaundice     Obesity     Statin intolerance     has tried multiple statins         Past Surgical History:   Procedure Laterality Date    HX CHOLECYSTECTOMY      HX CHOLECYSTECTOMY      HX COLONOSCOPY  2018    Dr Tapia Port:     Current Outpatient Medications   Medication Sig Dispense Refill    cpap machine kit by Does Not Apply route.  fenofibrate nanocrystallized (TRICOR) 48 mg tablet Take 1 Tab by mouth daily. 90 Tab 0    desonide (TRIDESILON) 0.05 % cream APPLY 1 APPLICATION TOPICAL TWICE A DAY,INSTR:TO AFFECTED AREA IN EAR  1    fluticasone propionate (FLONASE) 50 mcg/actuation nasal spray 2 Sprays by Both Nostrils route daily. 1 Bottle 1    naproxen sodium (ALEVE) 220 mg tablet Take 220 mg by mouth two (2) times daily (with meals).  IBUPROFEN (ADVIL PO) Take  by mouth.          Allergies   Allergen Reactions    Niacin Other (comments)     Headache, foggy feeling    Crestor [Rosuvastatin] Other (comments)     headache      Lipitor [Atorvastatin] Other (comments)     fatigue    Pravastatin Myalgia             SOCIAL HISTORY:     Social History     Tobacco Use    Smoking status: Former Smoker     Packs/day: 1.00 Years: 30.00     Pack years: 30.00     Last attempt to quit: 1998     Years since quittin.4    Smokeless tobacco: Never Used   Substance Use Topics    Alcohol use: Not Currently     Alcohol/week: 2.5 standard drinks     Types: 3 Cans of beer per week    Drug use: No         FAMILY HISTORY:     Family History   Problem Relation Age of Onset    Cancer Father         Non Hodgkins Lymphoma    Hypertension Brother     Elevated Lipids Brother     Dementia Mother     Other Brother         DVT - factor V leiden     Diabetes Neg Hx     Heart Disease Neg Hx            REVIEW OF SYMPTOMS:     Review of Symptoms:  Constitutional: Negative for fever, chills  HEENT: Negative for nosebleeds, tinnitus, and vision changes. Respiratory: Negative for cough, wheezing  Cardiovascular: Negative for orthopnea, claudication, syncope, and PND. Gastrointestinal: Negative for abdominal pain, diarrhea, melena. Genitourinary: Negative for dysuria  Musculoskeletal: Negative for myalgias. Skin: Negative for rash  Heme: No problems bleeding. Neurological: Negative for speech change and focal weakness. PHYSICAL EXAM:     Physical Exam:  Visit Vitals  /82 (BP 1 Location: Left arm, BP Patient Position: Sitting)   Pulse 65   Ht 5' 4\" (1.626 m)   Wt 195 lb (88.5 kg)   SpO2 96%   BMI 33.47 kg/m²     Patient appears generally well, mood and affect are appropriate and pleasant. HEENT:  Hearing intact, non-icteric, normocephalic, atraumatic. Neck Exam: Supple, No JVD or carotid bruits. Lung Exam: Clear to auscultation, even breath sounds. Cardiac Exam: Regular rate and rhythm with no murmur or rub  Abdomen: Soft, non-tender, normal bowel sounds. No bruits or masses. Extremities: Moves all ext well. No lower extremity edema. Vascular: 2+ dorsalis pedis pulses bilaterally.   Psych: Appropriate affect  Neuro - Grossly intact      LABS / OTHER STUDIES:       Lab Results   Component Value Date/Time Sodium 139 06/15/2020 08:22 AM    Potassium 4.5 06/15/2020 08:22 AM    Chloride 104 06/15/2020 08:22 AM    CO2 21 06/15/2020 08:22 AM    Anion gap 9 02/18/2012 02:36 PM    Glucose 104 (H) 06/15/2020 08:22 AM    BUN 14 06/15/2020 08:22 AM    Creatinine 1.10 06/15/2020 08:22 AM    BUN/Creatinine ratio 13 06/15/2020 08:22 AM    GFR est AA 86 06/15/2020 08:22 AM    GFR est non-AA 75 06/15/2020 08:22 AM    Calcium 9.6 06/15/2020 08:22 AM    Bilirubin, total 1.2 06/15/2020 08:22 AM    Alk.  phosphatase 59 06/15/2020 08:22 AM    Protein, total 6.9 06/15/2020 08:22 AM    Albumin 4.5 06/15/2020 08:22 AM    Globulin 3.7 02/18/2012 02:36 PM    A-G Ratio 1.9 06/15/2020 08:22 AM    ALT (SGPT) 30 06/15/2020 08:22 AM    AST (SGOT) 23 06/15/2020 08:22 AM     Lab Results   Component Value Date/Time    WBC 6.2 06/15/2020 08:22 AM    Hemoglobin (POC) 15.6 02/08/2010 12:46 PM    HGB 16.5 06/15/2020 08:22 AM    Hematocrit (POC) 46 02/08/2010 12:46 PM    HCT 50.3 06/15/2020 08:22 AM    PLATELET 880 52/57/5840 08:22 AM    MCV 87 06/15/2020 08:22 AM       Lab Results   Component Value Date/Time    Cholesterol, total 273 (H) 06/15/2020 08:22 AM    HDL Cholesterol 40 06/15/2020 08:22 AM    LDL, calculated 203 (H) 06/15/2020 08:22 AM    VLDL, calculated 30 06/15/2020 08:22 AM    Triglyceride 150 (H) 06/15/2020 08:22 AM       Lab Results   Component Value Date/Time    TSH 1.69 02/18/2012 02:36 PM       Component      Latest Ref Rng & Units 6/15/2020 10/11/2019 3/18/2019 8/4/2014           8:22 AM  9:15 AM  9:34 AM 10:01 AM   Cholesterol, total      100 - 199 mg/dL 273 (H) 246 (H) 310 (H) 259 (H)   Triglyceride      0 - 149 mg/dL 150 (H) 175 (H) 135 156 (H)   HDL Cholesterol      >39 mg/dL 40 45 52 46   VLDL, calculated      5 - 40 mg/dL 30 35 27 31   LDL, calculated      0 - 99 mg/dL 203 (H) 166 (H) 231 (H) 182 (H)         CARDIAC DIAGNOSTICS:     Cardiac Evaluation Includes:    Stress echo 2/2010 - normal study per reports     EKG 6/29/20 - NSR, normal       ASSESSMENT AND PLAN:     Assessment and Plan:  1) Dyslipidemia  - LDL has been about 200 for several years   - he's had myalgias from multiple statins (pravastatin, crestor, lipitor)  - discussed options - will try fibrate 48 mg and zetia 10 mg daily to get started and then increase fibrate and consider adding Crestor 5 mg once a week later   - Info given on CT heart scan to help guide therapy   - A PCSK9 Inhibitor would be effective in lower his cholesterol, however I don't think he'll meet the criteria insurance companies have set for its approval at this time. Will keep reassessing going forward     2) CV Risk assessment   - info given on a CT heart scan to guide therapy     3) BP fair - follow    4) Will see him back in 3 months with repeat labs before I see him. 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 Brendan Carrero MD, [address], [facility] [address]  Ph: [phone]    [phone]

## 2020-06-29 NOTE — PROGRESS NOTES
Chief Complaint   Patient presents with    Cholesterol Problem    New Patient    Hypertension     Visit Vitals  /82 (BP 1 Location: Left arm, BP Patient Position: Sitting)   Pulse 65   Ht 5' 4\" (1.626 m)   Wt 195 lb (88.5 kg)   SpO2 96%   BMI 33.47 kg/m²     Chest pain denied   SOB denied   Swelling in hands/feet left leg at end of the day after being on feet a lot; drive around town a lot; short drives. Dizziness denied   Recent hospital stays denied   Refills denied     Asking about fenofibrate; hasn't started yet. Dr. Ifrah Ng recommended for cholesterol meds; had trouble in past.  Pulse in throat lasts for a few seconds. Mostly happens when sitting down in evening.

## 2020-06-30 RX ORDER — FENOFIBRATE 54 MG/1
54 TABLET ORAL DAILY
Qty: 90 TAB | Refills: 1 | Status: SHIPPED | OUTPATIENT
Start: 2020-06-30 | End: 2021-02-22 | Stop reason: SDUPTHER

## 2020-06-30 NOTE — TELEPHONE ENCOUNTER
The request for coverage for fenofibrate (generic Tricor) 48mg, use as directed (30 per month), is  denied. This decision is based on health plan criteria for fenofibrate (generic Tricor). This medicine is  covered only if:  You have failed or cannot take all of the following:  (a) Fenofibrate 54mg (generic [place]). (b) Fenofibrate 160mg (generic Albino Hernandez).

## 2020-09-08 ENCOUNTER — DOCUMENTATION ONLY (OUTPATIENT)
Dept: SLEEP MEDICINE | Age: 55
End: 2020-09-08

## 2020-09-08 ENCOUNTER — VIRTUAL VISIT (OUTPATIENT)
Dept: SLEEP MEDICINE | Age: 55
End: 2020-09-08
Payer: COMMERCIAL

## 2020-09-08 DIAGNOSIS — G47.33 OBSTRUCTIVE SLEEP APNEA (ADULT) (PEDIATRIC): Primary | ICD-10-CM

## 2020-09-08 PROCEDURE — 99442 PR PHYS/QHP TELEPHONE EVALUATION 11-20 MIN: CPT | Performed by: NURSE PRACTITIONER

## 2020-09-08 NOTE — PATIENT INSTRUCTIONS
[address] [facility]., [place]. [address] [facility], [address] Tel.  [phone] Fax. [address] [facility] [address] [place], [address] [facility] Tel.  [phone] Fax. [phone] [address] Tel.  [phone] Fax. [phone] Learning About CPAP for Sleep Apnea What is CPAP? CPAP is a small machine that you use at home every night while you sleep. It increases air pressure in your throat to keep your airway open. When you have sleep apnea, this can help you sleep better so you feel much better. CPAP stands for \"continuous positive airway pressure. \" The CPAP machine will have one of the following: · A mask that covers your nose and mouth · Prongs that fit into your nose · A mask that covers your nose only, the most common type. This type is called NCPAP. The N stands for \"nasal.\" Why is it done? CPAP is usually the best treatment for obstructive sleep apnea. It is the first treatment choice and the most widely used. Your doctor may suggest CPAP if you have: · Moderate to severe sleep apnea. · Sleep apnea and coronary artery disease (CAD) or heart failure. How does it help? · CPAP can help you have more normal sleep, so you feel less sleepy and more alert during the daytime. · CPAP may help keep heart failure or other heart problems from getting worse. · NCPAP may help lower your blood pressure. · If you use CPAP, your bed partner may also sleep better because you are not snoring or restless. What are the side effects? Some people who use CPAP have: · A dry or stuffy nose and a sore throat. · Irritated skin on the face. · Sore eyes. · Bloating. If you have any of these problems, work with your doctor to fix them. Here are some things you can try: · Be sure the mask or nasal prongs fit well. · See if your doctor can adjust the pressure of your CPAP. · If your nose is dry, try a humidifier. · If your nose is runny or stuffy, try decongestant medicine or a steroid nasal spray. If these things do not help, you might try a different type of machine. Some machines have air pressure that adjusts on its own. Others have air pressures that are different when you breathe in than when you breathe out. This may reduce discomfort caused by too much pressure in your nose. Where can you learn more? Go to [employer] Enter Q099 in the search box to learn more about \"Learning About CPAP for Sleep Apnea. \"  
© 5482-5137 Healthwise, Incorporated. Care instructions adapted under license by [facility] (which disclaims liability or warranty for this information). This care instruction is for use with your licensed healthcare professional. If you have questions about a medical condition or this instruction, always ask your healthcare professional. [address] any warranty or liability for your use of this information. Content Version: 4.6.43341; Last Revised: January 11, 2010 PROPER SLEEP HYGIENE What to avoid · Do not have drinks with caffeine, such as coffee or black tea, for 8 hours before bed. · Do not smoke or use other types of tobacco near bedtime. Nicotine is a stimulant and can keep you awake. · Avoid drinking alcohol late in the evening, because it can cause you to wake in the middle of the night. · Do not eat a big meal close to bedtime. If you are hungry, eat a light snack. · Do not drink a lot of water close to bedtime, because the need to urinate may wake you up during the night. · Do not read or watch TV in bed. Use the bed only for sleeping and sexual activity. What to try · Go to bed at the same time every night, and wake up at the same time every morning. Do not take naps during the day. · Keep your bedroom quiet, dark, and cool. · Get regular exercise, but not within 3 to 4 hours of your bedtime. Davina Dhillon · Sleep on a comfortable pillow and mattress. · If watching the clock makes you anxious, turn it facing away from you so you cannot see the time. · If you worry when you lie down, start a worry book. Well before bedtime, write down your worries, and then set the book and your concerns aside. · Try meditation or other relaxation techniques before you go to bed. · If you cannot fall asleep, get up and go to another room until you feel sleepy. Do something relaxing. Repeat your bedtime routine before you go to bed again. · Make your house quiet and calm about an hour before bedtime. Turn down the lights, turn off the TV, log off the computer, and turn down the volume on music. This can help you relax after a busy day. Drowsy Driving: The [employer] cites drowsiness as a causing factor in more than 716,122 police reported crashes annually, resulting in 76,000 injuries and 1,500 deaths. Other surveys suggest 55% of people polled have driven while drowsy in the past year, 23% had fallen asleep but not crashed, 3% crashed, and 2% had and accident due to drowsy driving. Who is at risk? Young Drivers: One study of drowsy driving accidents states that 55% of the drivers were under 25 years. Of those, 75% were male. Shift Workers and Travelers: People who work overnight or travel across time zones frequently are at higher risk of experiencing Circadian Rhythm Disorders. They are trying to work and function when their body is programed to sleep. Sleep Deprived: Lack of sleep has a serious impact on your ability to pay attention or focus on a task. Consistently getting less than the average of 8 hours your body needs creates partial or cumulative sleep deprivation.   
Untreated Sleep Disorders: Sleep Apnea, Narcolepsy, R.L.S., and other sleep disorders (untreated) prevent a person from getting enough restful sleep. This leads to excessive daytime sleepiness and increases the risk for drowsy driving accidents by up to 7 times. Medications / Alcohol: Even over the counter medications can cause drowsiness. Medications that impair a drivers attention should have a warning label. Alcohol naturally makes you sleepy and on its own can cause accidents. Combined with excessive drowsiness its effects are amplified. Signs of Drowsy Driving: * You don't remember driving the last few miles * You may drift out of your sotero * You are unable to focus and your thoughts wander * You may yawn more often than normal 
 * You have difficulty keeping your eyes open / nodding off * Missing traffic signs, speeding, or tailgating Prevention-  
Good sleep hygiene, lifestyle and behavioral choices have the most impact on drowsy driving. There is no substitute for sleep and the average person requires 8 hours nightly. If you find yourself driving drowsy, stop and sleep. Consider the sleep hygiene tips provided during your visit as well. Medication Refill Policy: Refills for all medications require 1 week advance notice. Please have your pharmacy fax a refill request. We are unable to fax, or call in \"controled substance\" medications and you will need to pick these prescriptions up from our office. [employer] Activation Thank you for requesting access to Sean[employer]. Please follow the instructions below to securely access and download your online medical record. Seanodes allows you to send messages to your doctor, view your test results, renew your prescriptions, schedule appointments, and more. How Do I Sign Up? 1. In your internet browser, go to [email or web address]. 2. Click on the First Time User? Click Here link in the Sign In box. You will see the New Member Sign Up page. 3. Enter your Seanodes Access Code exactly as it appears below.  You will not need to use this code after youve completed the sign-up process. If you do not sign up before the expiration date, you must request a new code. Trilliant Access Code: Activation code not generated Current Trilliant Status: Active (This is the date your Trilliant access code will ) 4. Enter the last four digits of your Social Security Number (xxxx) and Date of Birth (mm/dd/yyyy) as indicated and click Submit. You will be taken to the next sign-up page. 5. Create a [employer] ID. This will be your Trilliant login ID and cannot be changed, so think of one that is secure and easy to remember. 6. Create a Trilliant password. You can change your password at any time. 7. Enter your Password Reset Question and Answer. This can be used at a later time if you forget your password. 8. Enter your e-mail address. You will receive e-mail notification when new information is available in [address]. 9. Click Sign Up. You can now view and download portions of your medical record. 10. Click the Download Summary menu link to download a portable copy of your medical information. Additional Information If you have questions, please call [phone]. Remember, Trilliant is NOT to be used for urgent needs. For medical emergencies, dial 911.

## 2020-09-08 NOTE — PROGRESS NOTES
[address] [facility] [place]. [place], [address]   Tel.  [phone]   Fax. [address] [facility]   [place], [address] [facility]   Tel.  [phone]   Fax. [phone] [address]   Tel.  [phone]   Fax. [phone]       Subjective:     Rubi Blackwood, who was evaluated through a patient-initiated, synchronous (real-time) audio only encounter, and/or her healthcare decision maker, is aware that it is a billable service, with coverage as determined by his insurance carrier. He provided verbal consent to proceed: Yes. He has not had a related appointment within my department in the past 7 days or scheduled within the next 24 hours. Attempted video visit, patient connection was weak and video not supported, audio only can completed. Total Time: minutes: 11-20 minutes    I was in the office while conducting this encounter. Patient verified with demographics. Rubi Blackwood is a 62 y.o. male, evaluated via audio-only technology on 9/8/2020 for a positive airway pressure follow-up, last seen by me on 9/10/2019, previously seen by Dr. Marcelo Keating on 6/25/2019, prior notes reviewed in detail. Home sleep test 6/2019 showed AHI of 5.1/hr with a lowest SaO2 of 79%, duration of SaO2 < 88% 11.8 min. He  is seen today for follow up on device set up 7/17/2019. 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 He reports no problems using the device. The following concerns reviewed:    Drowsiness no Problems exhaling no   Snoring no Forget to put on no   Mask Comfortable yes Can't fall asleep no   Dry Mouth no Mask falls off no   Air Leaking no Frequent awakenings no       He admits that his sleep has improved on PAP therapy using full face mask and heated tubing. He had been using a nasal mask but was opening his mouth so switched to a full face mask which has been working well.      Review of device download indicated:  Auto pressure: 5-10 cmH2O; Peak Avg Pressure: 8.2 cmH2O;  Avg. Device Pressure <= 90 %: 8.1 cmH2O    Average % Night in Large Leak:  0.0  % Used Days >= 4 hours: 100. Avg hours used:  7:47. Therapy Apnea Index averaged over PAP use: 0.7 /hr which reflects improved sleep breathing condition. Ava Sleepiness Score: 0 and Modified F.O.S.Q. Score Total / 2: 20 which reflects improved sleep quality over therapy time. Allergies   Allergen Reactions    Niacin Other (comments)     Headache, foggy feeling    Crestor [Rosuvastatin] Other (comments)     headache      Lipitor [Atorvastatin] Other (comments)     fatigue    Pravastatin Myalgia       He has a current medication list which includes the following prescription(s): fenofibrate, ezetimibe, cpap machine, desonide, fluticasone propionate, naproxen sodium, and ibuprofen. .      He  has a past medical history of High cholesterol, Hypercholesterolemia, Hypertension, Jaundice, Obesity, and Statin intolerance. Sleep Review of Systems: notable for Negative difficulty falling asleep; Negative awakenings at night; Negative early morning headaches; Negative memory problems; Negative concentration issues; Negative chest pain; Negative shortness of breath; Negative significant joint pain at night; Negative significant muscle pain at night; Negative rashes or itching; Negative heartburn; Negative significant mood issues; 0 afternoon naps per week    Objective:   Vitals reported by patient     Patient-Reported Vitals 9/8/2020   Patient-Reported Weight 195 lb   Patient-Reported Height -   Patient-Reported Pulse -   Patient-Reported Temperature -   Patient-Reported Systolic  -      Calculated BMI 34    Physical Exam not completed due to audio only visit. Assessment:       ICD-10-CM ICD-9-CM    1. Obstructive sleep apnea (adult) (pediatric)  G47.33 327.23 AMB SUPPLY ORDER   2. Adult BMI 34.0-34.9 kg/sq m  Z68.34 V85.34        AHI = 5.1(6/2019).   On APAP :  5-10 cmH2O. He is adherent with PAP therapy and PAP continues to benefit patient and remains necessary for control of his sleep apnea. Plan:     Follow-up and Dispositions    · Return in about 1 year (around 9/8/2021). * Continue on current pressures    * Supplies ordered - full face mask and heated tubing      Orders Placed This Encounter    AMB SUPPLY ORDER     Diagnosis: (G47.33) INGRID (obstructive sleep apnea)  (primary encounter diagnosis)     Replacement Supplies for Positive Airway Pressure Therapy Device:   Duration of need: 99 months.  Full Face Mask 1 every 3 months.  Full Face Mask Cushion 1 per month.  Headgear 1 every 6 months.  Tubing with heating element 1 every 3 months.  Filter(s) Disposable 2 per month.  Filter(s) Non-Disposable 1 every 6 months. .   [facility] for Humidifier (Replace) 1 every 6 months. KEYONNA RamiresPCNP-BC; NPI: [id number]    Electronically signed. Date:- 09/08/20       * Counseling was provided regarding the importance of regular PAP use with emphasis on ensuring sufficient total sleep time, proper sleep hygiene, and safe driving. * Re-enforced proper and regular cleaning for the device. * He was asked to contact our office for any problems regarding PAP therapy. 2. Recommended a dedicated weight loss program through appropriate diet and exercise regimen as significant weight reduction has been shown to reduce severity of obstructive sleep apnea. He is working in the yard. Patient's phone number [phone] (home)  was reviewed and confirmed for accuracy. He gives permission for messages regarding results and appointments to be left at that number.     Pursuant to the emergency declaration under the [address], [address] [place] [address] authority and the [employer] General Act, this telephone encounter was conducted with patient's (and/or legal guardian's) consent, to reduce the risk of exposure to COVID-19 and provide necessary medical care. Services were provided through a telephone call to substitute for in-person encounter. RANDA Portillo-BC, [address] [place] [address]  Electronically signed.  09/08/20

## 2020-10-02 ENCOUNTER — OFFICE VISIT (OUTPATIENT)
Dept: CARDIOLOGY CLINIC | Age: 55
End: 2020-10-02
Payer: COMMERCIAL

## 2020-10-02 VITALS
BODY MASS INDEX: 33.46 KG/M2 | OXYGEN SATURATION: 94 % | SYSTOLIC BLOOD PRESSURE: 138 MMHG | WEIGHT: 196 LBS | HEART RATE: 70 BPM | DIASTOLIC BLOOD PRESSURE: 78 MMHG | HEIGHT: 64 IN

## 2020-10-02 DIAGNOSIS — E78.49 OTHER HYPERLIPIDEMIA: Primary | ICD-10-CM

## 2020-10-02 PROCEDURE — 99214 OFFICE O/P EST MOD 30 MIN: CPT | Performed by: SPECIALIST

## 2020-10-02 NOTE — PROGRESS NOTES
Tigre Morales MD. [facility]              Patient: Cecelia Richardson  : 1965      Today's Date: 10/2/2020            HISTORY OF PRESENT ILLNESS:     History of Present Illness:  He feels fine. No complaints. Tolerating fibrate and zetia. No CP or SOB. Has some left ankle swelling at the end of day. PAST MEDICAL HISTORY:     Past Medical History:   Diagnosis Date    High cholesterol     Hypercholesterolemia     Hypertension     Jaundice     Obesity     Statin intolerance     has tried multiple statins         Past Surgical History:   Procedure Laterality Date    HX CHOLECYSTECTOMY      HX CHOLECYSTECTOMY      HX COLONOSCOPY  2018    Dr Ariadna Tuttle:     Current Outpatient Medications   Medication Sig Dispense Refill    fenofibrate (LOFIBRA) 54 mg tablet Take 1 Tab by mouth daily. Indications: high cholesterol 90 Tab 1    cpap machine kit by Does Not Apply route.  ezetimibe (ZETIA) 10 mg tablet Take 1 Tab by mouth daily. 90 Tab 3    fluticasone propionate (FLONASE) 50 mcg/actuation nasal spray 2 Sprays by Both Nostrils route daily. 1 Bottle 1    naproxen sodium (ALEVE) 220 mg tablet Take 220 mg by mouth two (2) times daily (with meals).  IBUPROFEN (ADVIL PO) Take  by mouth.       desonide (TRIDESILON) 0.05 % cream APPLY 1 APPLICATION TOPICAL TWICE A DAY,INSTR:TO AFFECTED AREA IN EAR  1       Allergies   Allergen Reactions    Niacin Other (comments)     Headache, foggy feeling    Crestor [Rosuvastatin] Other (comments)     headache      Lipitor [Atorvastatin] Other (comments)     fatigue    Pravastatin Myalgia           SOCIAL HISTORY:     Social History     Tobacco Use    Smoking status: Former Smoker     Packs/day: 1.00     Years: 30.00     Pack years: 30.00     Last attempt to quit: 1998     Years since quittin.6    Smokeless tobacco: Never Used   Substance Use Topics    Alcohol use: Not Currently     Alcohol/week: 2.5 standard drinks     Types: 3 Cans of beer per week    Drug use: No         FAMILY HISTORY:     Family History   Problem Relation Age of Onset    Cancer Father         Non Hodgkins Lymphoma    Hypertension Brother     Elevated Lipids Brother     Dementia Mother     Other Brother         DVT - factor V leiden     Diabetes Neg Hx     Heart Disease Neg Hx               REVIEW OF SYMPTOMS:      Review of Symptoms:  Constitutional: Negative for fever, chills  HEENT: Negative for nosebleeds, tinnitus, and vision changes. Respiratory: Negative for cough, wheezing  Cardiovascular: Negative for orthopnea, claudication, syncope, and PND. Gastrointestinal: Negative for abdominal pain, diarrhea, melena. Genitourinary: Negative for dysuria  Musculoskeletal: Negative for myalgias. Skin: Negative for rash  Heme: No problems bleeding. Neurological: Negative for speech change and focal weakness.            PHYSICAL EXAM:      Physical Exam:  Visit Vitals  /78 (BP 1 Location: Left arm, BP Patient Position: Sitting)   Pulse 70   Ht 5' 4\" (1.626 m)   Wt 196 lb (88.9 kg)   SpO2 94%   BMI 33.64 kg/m²       Patient appears generally well, mood and affect are appropriate and pleasant. HEENT:  Hearing intact, non-icteric, normocephalic, atraumatic. Neck Exam: Supple, No JVD   Lung Exam: Clear to auscultation, even breath sounds. Cardiac Exam: Regular rate and rhythm with no murmur or rub  Abdomen: Soft, non-tender, normal bowel sounds. No bruits or masses. Extremities: Moves all ext well. No lower extremity edema.   Psych: Appropriate affect  Neuro - Grossly intact        LABS / OTHER STUDIES:      Lab Results   Component Value Date/Time    Sodium 139 06/15/2020 08:22 AM    Potassium 4.5 06/15/2020 08:22 AM    Chloride 104 06/15/2020 08:22 AM    CO2 21 06/15/2020 08:22 AM    Anion gap 9 02/18/2012 02:36 PM    Glucose 104 (H) 06/15/2020 08:22 AM    BUN 14 06/15/2020 08:22 AM    Creatinine 1.10 06/15/2020 08:22 AM BUN/Creatinine ratio 13 06/15/2020 08:22 AM    GFR est AA 86 06/15/2020 08:22 AM    GFR est non-AA 75 06/15/2020 08:22 AM    Calcium 9.6 06/15/2020 08:22 AM    Bilirubin, total 1.2 06/15/2020 08:22 AM    Alk. phosphatase 59 06/15/2020 08:22 AM    Protein, total 6.9 06/15/2020 08:22 AM    Albumin 4.5 06/15/2020 08:22 AM    Globulin 3.7 02/18/2012 02:36 PM    A-G Ratio 1.9 06/15/2020 08:22 AM    ALT (SGPT) 30 06/15/2020 08:22 AM    AST (SGOT) 23 06/15/2020 08:22 AM     Lab Results   Component Value Date/Time    Cholesterol, total 273 (H) 06/15/2020 08:22 AM    HDL Cholesterol 40 06/15/2020 08:22 AM    LDL, calculated 203 (H) 06/15/2020 08:22 AM    VLDL, calculated 30 06/15/2020 08:22 AM    Triglyceride 150 (H) 06/15/2020 08:22 AM       Lab Results   Component Value Date/Time    WBC 6.2 06/15/2020 08:22 AM    Hemoglobin (POC) 15.6 02/08/2010 12:46 PM    HGB 16.5 06/15/2020 08:22 AM    Hematocrit (POC) 46 02/08/2010 12:46 PM    HCT 50.3 06/15/2020 08:22 AM    PLATELET 963 44/12/0950 08:22 AM    MCV 87 06/15/2020 08:22 AM               CARDIAC DIAGNOSTICS:      Cardiac Evaluation Includes:     Stress echo 2/2010 - normal study per reports      EKG 6/29/20 - NSR, normal         ASSESSMENT AND PLAN:      Assessment and Plan:  1) Dyslipidemia  - LDL has been about 200 for several years   - he's had myalgias from multiple statins (pravastatin, crestor, lipitor)  - On 6/20 - discussed options - will try fibrate 48 mg and zetia 10 mg daily to get started and then increase fibrate and consider adding Crestor 5 mg once a week later   - Info given on CT heart scan to help guide therapy   - A PCSK9 Inhibitor would be effective in lower his cholesterol, however I don't think he'll meet the criteria insurance companies have set for its approval at this time. Will keep reassessing going forward   - On 10/2/20 - he is tolerating low dose fibrate and zetia -- he would like to avoid a statin for now. Check lipids.   Reviewed diet and exercise.      2) CV Risk assessment   - info given on a CT heart scan to guide therapy -- he is not interested in getting this at this time     3) BP fair - follow     4) Will see him back in 6 months. 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          Carlos Georges MD, [address]     [address]  [address], [address]                 [place], [address] [place] [address]  Ph: [phone]                               Ph [phone]

## 2020-10-02 NOTE — PROGRESS NOTES
Chief Complaint   Patient presents with    Follow-up     3 months    Cholesterol Problem    Hypertension     Visit Vitals  /78 (BP 1 Location: Left arm, BP Patient Position: Sitting)   Pulse 70   Ht 5' 4\" (1.626 m)   Wt 196 lb (88.9 kg)   SpO2 94%   BMI 33.64 kg/m²     Chest pain denied   SOB denied   Swelling in hands/feet left ankle at end of day  Dizziness denied   Recent hospital stays denied   Refills denied     Doing labs after visit today

## 2020-10-04 LAB
ALBUMIN SERPL-MCNC: 4.6 G/DL (ref 3.8–4.9)
ALBUMIN/GLOB SERPL: 1.9 {RATIO} (ref 1.2–2.2)
ALP SERPL-CCNC: 56 IU/L (ref 39–117)
ALT SERPL-CCNC: 25 IU/L (ref 0–44)
AST SERPL-CCNC: 17 IU/L (ref 0–40)
BILIRUB SERPL-MCNC: 0.9 MG/DL (ref 0–1.2)
BUN SERPL-MCNC: 13 MG/DL (ref 6–24)
BUN/CREAT SERPL: 11 (ref 9–20)
CALCIUM SERPL-MCNC: 9.6 MG/DL (ref 8.7–10.2)
CHLORIDE SERPL-SCNC: 105 MMOL/L (ref 96–106)
CHOLEST SERPL-MCNC: 200 MG/DL (ref 100–199)
CO2 SERPL-SCNC: 26 MMOL/L (ref 20–29)
CREAT SERPL-MCNC: 1.14 MG/DL (ref 0.76–1.27)
GLOBULIN SER CALC-MCNC: 2.4 G/DL (ref 1.5–4.5)
GLUCOSE SERPL-MCNC: 101 MG/DL (ref 65–99)
HDL SERPL-SCNC: 35.7 UMOL/L
HDLC SERPL-MCNC: 49 MG/DL
INTERPRETATION, 910389: NORMAL
LDL SERPL QN: 20.7 NM
LDL SERPL-SCNC: 1690 NMOL/L
LDL SMALL SERPL-SCNC: 824 NMOL/L
LDLC SERPL CALC-MCNC: 129 MG/DL (ref 0–99)
LP-IR SCORE SERPL: 65
POTASSIUM SERPL-SCNC: 4.3 MMOL/L (ref 3.5–5.2)
PROT SERPL-MCNC: 7 G/DL (ref 6–8.5)
SODIUM SERPL-SCNC: 142 MMOL/L (ref 134–144)
TRIGL SERPL-MCNC: 123 MG/DL (ref 0–149)
TSH SERPL DL<=0.005 MIU/L-ACNC: 1.46 UIU/ML (ref 0.45–4.5)

## 2021-02-17 ENCOUNTER — TELEPHONE (OUTPATIENT)
Dept: SLEEP MEDICINE | Age: 56
End: 2021-02-17

## 2021-02-22 DIAGNOSIS — E78.00 HYPERCHOLESTEROLEMIA: ICD-10-CM

## 2021-02-22 DIAGNOSIS — Z78.9 STATIN INTOLERANCE: ICD-10-CM

## 2021-02-22 RX ORDER — FENOFIBRATE 54 MG/1
54 TABLET ORAL DAILY
Qty: 90 TAB | Refills: 1 | Status: SHIPPED | OUTPATIENT
Start: 2021-02-22

## 2021-03-17 ENCOUNTER — TELEPHONE (OUTPATIENT)
Dept: CARDIOLOGY CLINIC | Age: 56
End: 2021-03-17

## 2021-03-17 DIAGNOSIS — E78.5 DYSLIPIDEMIA: ICD-10-CM

## 2021-03-17 DIAGNOSIS — E78.49 OTHER HYPERLIPIDEMIA: Primary | ICD-10-CM

## 2021-03-17 DIAGNOSIS — I10 HYPERTENSION, UNSPECIFIED TYPE: ICD-10-CM

## 2021-04-01 ENCOUNTER — TELEPHONE (OUTPATIENT)
Dept: SLEEP MEDICINE | Age: 56
End: 2021-04-01

## 2021-04-01 NOTE — TELEPHONE ENCOUNTER
Patient states that the pressure on his cpap machine feels like it has gone down and he wonders if he can get the pressure raised.

## 2021-04-02 LAB
ALBUMIN SERPL-MCNC: 4.5 G/DL (ref 3.8–4.9)
ALBUMIN/GLOB SERPL: 2 {RATIO} (ref 1.2–2.2)
ALP SERPL-CCNC: 54 IU/L (ref 39–117)
ALT SERPL-CCNC: 26 IU/L (ref 0–44)
AST SERPL-CCNC: 20 IU/L (ref 0–40)
BILIRUB SERPL-MCNC: 1.1 MG/DL (ref 0–1.2)
BUN SERPL-MCNC: 14 MG/DL (ref 6–24)
BUN/CREAT SERPL: 13 (ref 9–20)
CALCIUM SERPL-MCNC: 9.6 MG/DL (ref 8.7–10.2)
CHLORIDE SERPL-SCNC: 104 MMOL/L (ref 96–106)
CHOLEST SERPL-MCNC: 219 MG/DL (ref 100–199)
CO2 SERPL-SCNC: 25 MMOL/L (ref 20–29)
CREAT SERPL-MCNC: 1.08 MG/DL (ref 0.76–1.27)
GLOBULIN SER CALC-MCNC: 2.2 G/DL (ref 1.5–4.5)
GLUCOSE SERPL-MCNC: 101 MG/DL (ref 65–99)
HDL SERPL-SCNC: 32.3 UMOL/L
HDLC SERPL-MCNC: 47 MG/DL
LDL SERPL QN: 20.9 NM
LDL SERPL-SCNC: 1742 NMOL/L
LDL SMALL SERPL-SCNC: 604 NMOL/L
LDLC SERPL CALC-MCNC: 146 MG/DL (ref 0–99)
LP-IR SCORE SERPL: 75
POTASSIUM SERPL-SCNC: 4.7 MMOL/L (ref 3.5–5.2)
PROT SERPL-MCNC: 6.7 G/DL (ref 6–8.5)
SODIUM SERPL-SCNC: 140 MMOL/L (ref 134–144)
TRIGL SERPL-MCNC: 144 MG/DL (ref 0–149)

## 2021-04-07 ENCOUNTER — TELEPHONE (OUTPATIENT)
Dept: SLEEP MEDICINE | Age: 56
End: 2021-04-07

## 2021-04-07 ENCOUNTER — DOCUMENTATION ONLY (OUTPATIENT)
Dept: SLEEP MEDICINE | Age: 56
End: 2021-04-07

## 2021-04-07 DIAGNOSIS — G47.33 OBSTRUCTIVE SLEEP APNEA (ADULT) (PEDIATRIC): Primary | ICD-10-CM

## 2021-04-07 NOTE — TELEPHONE ENCOUNTER
Radha Rudd (: 1965) last seen by me on 2020, previously seen by Dr. Claudeen Starr 2019, prior notes reviewed in detail. Home sleep test 2019 showed AHI of 5.1/hr with a lowest SaO2 of 79%, duration of SaO2 < 88% 11.8 min. Device set up 2019. Called patient regarding his concern that the device is not generating as much pressure as before. He has noted that he is feeling tired in the morning and not well rested over the past month. Review of device download shows no change in pressure settings on device, currently set at APAP 5-10 cmH2O. Change pressure to APAP 7-10 cm H2O, changes made by provider in  Care  system and [facility] to [facility]. Orders Placed This Encounter    AMB SUPPLY ORDER     Diagnosis: (G47.33) INGRID (obstructive sleep apnea)  (primary encounter diagnosis)     Pressure change APAP to 7-10 cmH2O. Changes made in sleep lab. DANK Gonzalez; NPI: [id number]    Electronically signed.  Date:- 21     Fidencio Theodore NP, [facility]  21

## 2021-04-16 ENCOUNTER — ANCILLARY PROCEDURE (OUTPATIENT)
Dept: CARDIOLOGY CLINIC | Age: 56
End: 2021-04-16
Payer: COMMERCIAL

## 2021-04-16 ENCOUNTER — OFFICE VISIT (OUTPATIENT)
Dept: CARDIOLOGY CLINIC | Age: 56
End: 2021-04-16

## 2021-04-16 VITALS
HEART RATE: 68 BPM | SYSTOLIC BLOOD PRESSURE: 130 MMHG | BODY MASS INDEX: 33.36 KG/M2 | OXYGEN SATURATION: 94 % | WEIGHT: 195.4 LBS | HEIGHT: 64 IN | DIASTOLIC BLOOD PRESSURE: 82 MMHG

## 2021-04-16 VITALS
SYSTOLIC BLOOD PRESSURE: 126 MMHG | HEIGHT: 64 IN | BODY MASS INDEX: 33.29 KG/M2 | DIASTOLIC BLOOD PRESSURE: 80 MMHG | WEIGHT: 195 LBS

## 2021-04-16 DIAGNOSIS — I10 HYPERTENSION, UNSPECIFIED TYPE: ICD-10-CM

## 2021-04-16 DIAGNOSIS — Z78.9 STATIN INTOLERANCE: ICD-10-CM

## 2021-04-16 DIAGNOSIS — Z91.89 CARDIOVASCULAR RISK FACTOR: Primary | ICD-10-CM

## 2021-04-16 DIAGNOSIS — Z91.89 CARDIOVASCULAR RISK FACTOR: ICD-10-CM

## 2021-04-16 DIAGNOSIS — E78.00 HYPERCHOLESTEROLEMIA: ICD-10-CM

## 2021-04-16 LAB
STRESS ANGINA INDEX: 0
STRESS BASELINE DIAS BP: 80 MMHG
STRESS BASELINE HR: 78 BPM
STRESS BASELINE SYS BP: 126 MMHG
STRESS ESTIMATED WORKLOAD: 13.4 METS
STRESS EXERCISE DUR MIN: NORMAL
STRESS O2 SAT PEAK: 90 %
STRESS O2 SAT REST: 96 %
STRESS PEAK DIAS BP: 86 MMHG
STRESS PEAK SYS BP: 142 MMHG
STRESS PERCENT HR ACHIEVED: 99 %
STRESS POST PEAK HR: 162 BPM
STRESS RATE PRESSURE PRODUCT: NORMAL BPM*MMHG
STRESS TARGET HR: 163 BPM

## 2021-04-16 PROCEDURE — 93015 CV STRESS TEST SUPVJ I&R: CPT | Performed by: SPECIALIST

## 2021-04-16 PROCEDURE — 99214 OFFICE O/P EST MOD 30 MIN: CPT | Performed by: SPECIALIST

## 2021-04-16 RX ORDER — ROSUVASTATIN CALCIUM 5 MG/1
5 TABLET, COATED ORAL
Qty: 15 TAB | Refills: 6 | Status: SHIPPED | OUTPATIENT
Start: 2021-04-16 | End: 2021-10-22 | Stop reason: SDUPTHER

## 2021-04-16 NOTE — PROGRESS NOTES
Alisson Ley MD. [facility]              Patient: Kalpana Pritchard  : 1965      Today's Date: 2021          HISTORY OF PRESENT ILLNESS:     History of Present Illness:  Here for follow-up. No complaints. No CP or SOB. Tolerating meds. PAST MEDICAL HISTORY:     Past Medical History:   Diagnosis Date    High cholesterol     Hypercholesterolemia     Hypertension     Jaundice     Obesity     Statin intolerance     has tried multiple statins       Past Surgical History:   Procedure Laterality Date    HX CHOLECYSTECTOMY      HX CHOLECYSTECTOMY      HX COLONOSCOPY      Dr Eric Gilford:     Current Outpatient Medications   Medication Sig Dispense Refill    fenofibrate (LOFIBRA) 54 mg tablet Take 1 Tab by mouth daily. Indications: high cholesterol 90 Tab 1    cpap machine kit by Does Not Apply route.  ezetimibe (ZETIA) 10 mg tablet Take 1 Tab by mouth daily. 90 Tab 3    desonide (TRIDESILON) 0.05 % cream APPLY 1 APPLICATION TOPICAL TWICE A DAY,INSTR:TO AFFECTED AREA IN EAR  1    fluticasone propionate (FLONASE) 50 mcg/actuation nasal spray 2 Sprays by Both Nostrils route daily. 1 Bottle 1    naproxen sodium (ALEVE) 220 mg tablet Take 220 mg by mouth two (2) times daily (with meals).  IBUPROFEN (ADVIL PO) Take  by mouth. Allergies   Allergen Reactions    Niacin Other (comments)     Headache, foggy feeling    Crestor [Rosuvastatin] Other (comments)     headache      Lipitor [Atorvastatin] Other (comments)     fatigue    Pravastatin Myalgia             SOCIAL HISTORY:     Social History     Tobacco Use    Smoking status: Former Smoker     Packs/day: 1.00     Years: 30.00     Pack years: 30.00     Quit date: 1998     Years since quittin.2    Smokeless tobacco: Never Used   Substance Use Topics    Alcohol use: Not Currently     Alcohol/week: 2.5 standard drinks     Types: 3 Cans of beer per week    Drug use:  No FAMILY HISTORY:     Family History   Problem Relation Age of Onset    Cancer Father         Non Hodgkins Lymphoma    Hypertension Brother     Elevated Lipids Brother     Dementia Mother     Other Brother         DVT - factor V leiden     Diabetes Neg Hx     Heart Disease Neg Hx           REVIEW OF SYMPTOMS:      Review of Symptoms:  Constitutional: Negative for fever, chills  HEENT: Negative for nosebleeds, tinnitus, and vision changes. Respiratory: Negative for cough, wheezing  Cardiovascular: Negative for orthopnea, claudication, syncope, and PND. Gastrointestinal: Negative for abdominal pain, diarrhea, melena. Genitourinary: Negative for dysuria  Musculoskeletal: Negative for myalgias. Skin: Negative for rash  Heme: No problems bleeding. Neurological: Negative for speech change and focal weakness.            PHYSICAL EXAM:      Physical Exam:  Visit Vitals  /82 (BP 1 Location: Left upper arm, BP Patient Position: Sitting)   Pulse 68   Ht 5' 4\" (1.626 m)   Wt 195 lb 6.4 oz (88.6 kg)   SpO2 94%   BMI 33.54 kg/m²          Patient appears generally well, mood and affect are appropriate and pleasant. HEENT:  Hearing intact, non-icteric, normocephalic, atraumatic. Neck Exam: Supple, No JVD   Lung Exam: Clear to auscultation, even breath sounds. Cardiac Exam: Regular rate and rhythm with no murmur or rub  Abdomen: Soft, non-tender  Extremities: Moves all ext well. No lower extremity edema.   Psych: Appropriate affect  Neuro - Grossly intact        LABS / OTHER STUDIES:      Lab Results   Component Value Date/Time    Sodium 140 04/01/2021 08:04 AM    Potassium 4.7 04/01/2021 08:04 AM    Chloride 104 04/01/2021 08:04 AM    CO2 25 04/01/2021 08:04 AM    Anion gap 9 02/18/2012 02:36 PM    Glucose 101 (H) 04/01/2021 08:04 AM    BUN 14 04/01/2021 08:04 AM    Creatinine 1.08 04/01/2021 08:04 AM    BUN/Creatinine ratio 13 04/01/2021 08:04 AM    GFR est AA 88 04/01/2021 08:04 AM    GFR est non-AA 76 04/01/2021 08:04 AM    Calcium 9.6 04/01/2021 08:04 AM    Bilirubin, total 1.1 04/01/2021 08:04 AM    Alk.  phosphatase 54 04/01/2021 08:04 AM    Protein, total 6.7 04/01/2021 08:04 AM    Albumin 4.5 04/01/2021 08:04 AM    Globulin 3.7 02/18/2012 02:36 PM    A-G Ratio 2.0 04/01/2021 08:04 AM    ALT (SGPT) 26 04/01/2021 08:04 AM    AST (SGOT) 20 04/01/2021 08:04 AM     Lab Results   Component Value Date/Time    WBC 6.2 06/15/2020 08:22 AM    Hemoglobin (POC) 15.6 02/08/2010 12:46 PM    HGB 16.5 06/15/2020 08:22 AM    Hematocrit (POC) 46 02/08/2010 12:46 PM    HCT 50.3 06/15/2020 08:22 AM    PLATELET 809 59/03/0042 08:22 AM    MCV 87 06/15/2020 08:22 AM       Lab Results   Component Value Date/Time    Cholesterol, total 273 (H) 06/15/2020 08:22 AM    Cholesterol, Total 219 (H) 04/01/2021 08:04 AM    HDL Cholesterol 40 06/15/2020 08:22 AM    LDL, calculated 203 (H) 06/15/2020 08:22 AM    VLDL, calculated 30 06/15/2020 08:22 AM    Triglyceride 150 (H) 06/15/2020 08:22 AM     Component      Latest Ref Rng & Units 4/1/2021 10/2/2020           8:04 AM  9:11 AM   LDL-P      <1,000 nmol/L 1,742 (H) 1,690 (H)   LDL-C(NIH CALC)      0 - 99 mg/dL 146 (H) 129 (H)   HDL-C      >39 mg/dL 47 49   Triglycerides      0 - 149 mg/dL 144 123   Cholesterol, total      100 - 199 mg/dL 219 (H) 200 (H)   HDL-P (Total)      >=30.5 umol/L 32.3 35.7   Small LDL-P      <=527 nmol/L 604 (H) 824 (H)   LDL size      >20.5 nm 20.9 20.7   LP-IR SCORE      <=45 75 (H) 65 (H)               CARDIAC DIAGNOSTICS:      Cardiac Evaluation Includes:     Stress echo 2/2010 - normal study per reports     Treadmill Stress test 4/16/21 - walked 10 min (13 METS), borderline EKG changes which resolved quickly in recovery - no high risk findings      EKG 6/29/20 - NSR, normal         ASSESSMENT AND PLAN:      Assessment and Plan:  1) Dyslipidemia  - LDL has been about 200 for several years   - he's had myalgias from multiple statins (pravastatin, crestor, lipitor)  - On 6/20 - discussed options - will try fibrate 48 mg and zetia 10 mg daily to get started and then increase fibrate and consider adding Crestor 5 mg once a week later   - Info given on CT heart scan to help guide therapy   - A PCSK9 Inhibitor would be effective in lower his cholesterol, however I don't think he'll meet the criteria insurance companies have set for its approval at this time. Will keep reassessing going forward   - On 10/2/20 - he is tolerating low dose fibrate and zetia -- he would like to avoid a statin for now. Check lipids. Reviewed diet and exercise. - On 4/16/21 - Lipids are much better than before ( in 2019) but still higher than goal.   He is taking Zetia and low dose fibrate - although did stop meds for a while since he had some pain in hands. He declines a PCSK9 Inhibitor. He is agreeable to add crestor 5 mg once a week.    2) CV Risk assessment   - info given on a CT heart scan to guide therapy -- he is not interested in getting this at this time  - He would like to check a treadmill stress test for CV risk stratification --> Stress test was normal (see above)      3) BP fair - follow     4) Will see him back in 6 months. 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          Mayela Guerrero MD, [address] [place] [facility], [address] [address] [facility]  [address]  [facility], [address] [place] [address]  Ph: [phone]                                [phone]

## 2021-04-16 NOTE — PROGRESS NOTES
Valeria Davis is a 60 y.o. male    Chief Complaint   Patient presents with    Follow-up    Cholesterol Problem    Hypertension         Chest pain No    SOB No    Dizziness Non    Swelling No    Refills No; patient would like to change to [facility]    Visit Vitals  /82 (BP 1 Location: Left upper arm, BP Patient Position: Sitting)   Pulse 68   Ht 5' 4\" (1.626 m)   Wt 195 lb 6.4 oz (88.6 kg)   SpO2 94%   BMI 33.54 kg/m²       1. Have you been to the ER, urgent care clinic since your last visit? Hospitalized since your last visit? no    2. Have you seen or consulted any other health care providers outside of the [address] [place] [address] since your last visit? Include any pap smears or colon screening.   no

## 2021-04-29 ENCOUNTER — TELEPHONE (OUTPATIENT)
Dept: FAMILY MEDICINE CLINIC | Age: 56
End: 2021-04-29

## 2021-04-29 NOTE — TELEPHONE ENCOUNTER
Pt called to request records regarding his gallbladder removal surgery by Dr. Alyx Peterson done in 2007 and regarding biliary obstruction treated at [facility] by Dr. Cal Smith. Pt requested medical records release be emailed to [email or web address] Pt  was advised all records may not be available within [facility] and may also need to contact both doctor's offices.  Rupert

## 2021-05-24 NOTE — PROGRESS NOTES
Chief Complaint   Patient presents with    Cholesterol Problem     1. Have you been to the ER, urgent care clinic since your last visit? Hospitalized since your last visit? No    2. Have you seen or consulted any other health care providers outside of the [address] [place] [address] since your last visit? Include any pap smears or colon screening.  No Patient Education        Migraine Headache: Care Instructions  Overview     Migraines are painful, throbbing headaches that often start on one side of the head. They may cause nausea and vomiting and make you sensitive to light, sound, or smell. Without treatment, migraines can last from 4 hours to a few days. Medicines can help prevent migraines or stop them after they have started. Your doctor can help you find which ones work best for you. Follow-up care is a key part of your treatment and safety. Be sure to make and go to all appointments, and call your doctor if you are having problems. It's also a good idea to know your test results and keep a list of the medicines you take. How can you care for yourself at home? · Do not drive if you have taken a prescription pain medicine. · Rest in a quiet, dark room until your headache is gone. Close your eyes, and try to relax or go to sleep. Don't watch TV or read. · Put a cold, moist cloth or cold pack on the painful area for 10 to 20 minutes at a time. Put a thin cloth between the cold pack and your skin. · Use a warm, moist towel or a heating pad set on low to relax tight shoulder and neck muscles. · Have someone gently massage your neck and shoulders. · Take your medicines exactly as prescribed. Call your doctor if you think you are having a problem with your medicine. You will get more details on the specific medicines your doctor prescribes. · Don't take medicine for headache pain too often. Talk to your doctor if you are taking medicine more than 2 days a week to stop a headache. Taking too much pain medicine can lead to more headaches. These are called medicine-overuse headaches. To prevent migraines  · Keep a headache diary so you can figure out what triggers your headaches. Avoiding triggers may help you prevent headaches. Record when each headache began, how long it lasted, and what the pain was like.  Write down any other symptoms you had with the headache, such as nausea, flashing lights or dark spots, or sensitivity to bright light or loud noise. Note if the headache occurred near your period. List anything that might have triggered the headache. Triggers may include certain foods (chocolate, cheese, wine) or odors, smoke, bright light, stress, or lack of sleep. · If your doctor has prescribed medicine for your migraines, take it as directed. You may have medicine that you take only when you get a migraine and medicine that you take all the time to help prevent migraines. ? If your doctor has prescribed medicine for when you get a headache, take it at the first sign of a migraine, unless your doctor has given you other instructions. ? If your doctor has prescribed medicine to prevent migraines, take it exactly as prescribed. Call your doctor if you think you are having a problem with your medicine. · Find healthy ways to deal with stress. Migraines are most common during or right after stressful times. Try finding ways to reduce stress like practicing mindfulness or deep breathing exercises. · Get plenty of sleep and exercise. But be careful to not push yourself too hard during exercise. It may trigger a headache. · Eat meals on a regular schedule. Avoid foods and drinks that often trigger migraines. These include chocolate, alcohol (especially red wine and port), aspartame, monosodium glutamate (MSG), and some additives found in foods (such as hot dogs, wilhelm, cold cuts, aged cheeses, and pickled foods). · Limit caffeine. Don't drink too much coffee, tea, or soda. But don't quit caffeine suddenly. That can also give you migraines. · Do not smoke or allow others to smoke around you. If you need help quitting, talk to your doctor about stop-smoking programs and medicines. These can increase your chances of quitting for good.   · If you are taking birth control pills or hormone therapy, talk to your doctor about whether they are triggering your migraines. When should you call for help? Call 911 anytime you think you may need emergency care. For example, call if:    · You have signs of a stroke. These may include:  ? Sudden numbness, paralysis, or weakness in your face, arm, or leg, especially on only one side of your body. ? Sudden vision changes. ? Sudden trouble speaking. ? Sudden confusion or trouble understanding simple statements. ? Sudden problems with walking or balance. ? A sudden, severe headache that is different from past headaches. Call your doctor now or seek immediate medical care if:    · You have new or worse nausea and vomiting.     · You have a new or higher fever.     · Your headache gets much worse. Watch closely for changes in your health, and be sure to contact your doctor if:    · You are not getting better after 2 days (48 hours). Where can you learn more? Go to https://SourcerypeSharetribe.[employer]. org and sign in to your [employer] account. Enter U805 in the [employer] box to learn more about \"Migraine Headache: Care Instructions. \"     If you do not have an account, please click on the \"Sign Up Now\" link. Current as of: August 4, 2020               Content Version: 12.8  © 2006-2021 Healthwise, [facility]. Care instructions adapted under license by [facility] ([facility]). If you have questions about a medical condition or this instruction, always ask your healthcare professional. [place] [address] any warranty or liability for your use of this information.

## 2021-07-07 RX ORDER — EZETIMIBE 10 MG/1
10 TABLET ORAL DAILY
Qty: 90 TABLET | Refills: 1 | Status: SHIPPED | OUTPATIENT
Start: 2021-07-07

## 2021-07-07 NOTE — TELEPHONE ENCOUNTER
Refill per VO of Dr. Luis Garrido  Last appt: 4/16/21  Future Appointments   Date Time Provider Nael [place]   9/7/2021  8:50 AM Jules Schwab, NP [address] BS AMB   10/22/2021  8:20 AM MD PAT JohnstonSF BS AMB       Requested Prescriptions     Signed Prescriptions Disp Refills    ezetimibe (ZETIA) 10 mg tablet 90 Tablet 1     Sig: Take 1 Tablet by mouth daily.      Authorizing Provider: Trung Sotomayor     Ordering User: Nestor Clarke

## 2021-07-20 ENCOUNTER — TELEPHONE (OUTPATIENT)
Dept: SLEEP MEDICINE | Age: 56
End: 2021-07-20

## 2021-07-20 NOTE — TELEPHONE ENCOUNTER
Please contact Mr. Truong Aranda to go over recall information.  He is working on registering his machine now but has been having trouble with the site to do so

## 2021-07-21 ENCOUNTER — TELEPHONE (OUTPATIENT)
Dept: FAMILY MEDICINE CLINIC | Age: 56
End: 2021-07-21

## 2021-07-21 NOTE — TELEPHONE ENCOUNTER
Pt states he got a letter from [place] recalling his CPAP machine due to a problem with the filter. They informed him they could not replace the machine without a new order from the Dr.   He did call Dr Ulises Gaitan and they told him there was nothing they could do. 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 Asking if there is something our office can do to help with this?   Please call [phone]

## 2021-07-21 NOTE — TELEPHONE ENCOUNTER
MICHAEL Luke 65 and spoke with Melodie Robles. She advises 4 million Guillen CPAP machines were recalled worldwide, and [facility] has 8,000 patients with this machine, and pt was advised of steps, via mychart on yesterday. Patient can always call the office with alteratives, like a referral to a dentist, and have an implant placed, but he will need to call, to ensure referral is appropriate. Currently there is no ETA on when new machines will be delivered due to the number of machines that were recalled. Called pt, and advised of what Elen Garcia advised me. Also recommended he look at his mychart, and follow steps provided and contact sleep center if needed. Pt agrees with plan.

## 2021-07-23 ENCOUNTER — TELEPHONE (OUTPATIENT)
Dept: SLEEP MEDICINE | Age: 56
End: 2021-07-23

## 2021-07-23 NOTE — TELEPHONE ENCOUNTER
PATIENT RESPONSE / FOLLOW UP NEEDS  Patient wants RX for new device  RECALL EDUCATION PROVIDED      Are you aware of why there is a recall? - Yes  o You can visit the [facility] for more information. o Recall is related specifically to the type of foam used to reduce the noise made by the device. Over time, the foam may break down into small black particles that have the potential to be inhaled. Stephanie Toro has also done testing that shows breakdown of the foam can release unsafe gases. - In 2020, the complaint rate for foam particles was low (0.03%)  - NO REPORTS OF DEATH  - NO COMPLAINTS OF CHEMICAL EXPOSURE  o Potential risks include  - Headache  - Irritation of the airway, skin, or eyes  - Asthma  - Nausea or Vomiting    RECOMMENDATIONS:    The first step is to make sure we have your device added to the [employer] in order to begin a claim. Have you already been contacted by [employer] or your medical equipment provider? Yes       Based on [employer] communication, you should stop using your equipment, but it is important that you consider the risks of both continuing and discontinuing the use of your device to treat your sleep apnea. If you choose to continue using your machine, it is at your own discretion. There is not a clear answer, and this is a difficult situation. As for actions, you should register your device by phone or access the website as explained earlier. It is also recommended that you DO NOT USE any type of CPAP cleaning machines or harsh chemicals. We have heard that the use of ozone or other cleaning devices may be contributing to the problem by increasing the rate at which the foam can potentially break down. Therefore, we advise you to follow the 's instructions to clean your tube, tank, and mask with mild soap and water. If you are using ozone products to clean your PAP device, [employer] advises you to STOP.      If you choose to stop using your device, you may benefit from the use of alternate therapies such as  - Positional changes  o Avoid sleeping flat or on your back, elevate the head of your bed with pillows or other positional devices such as slumber bumper, sleep noodle, wedge. o Sleeping in a recliner  - Mandibular devices that reposition jaw for better airflow.   o You can get a referral for this from you doctor if you would like to follow up with this option.   - Use of inline bacteria filter.   o Both Jean Carlos and the FDA have noted that additional bacterial filters are not designed or intended for use with APAP devices and may cause device issues. Additionally, these filters do not resolve vapor or fume concerns from the foam in the devices. - Because these are not FDA approved, we cannot order these filters for you, but other patients have elected to purchase them on their own through a [address] [place] [address] or online.   - If you chose to use an inline filter, it is important that you research how it may affect the performance of your device. If the filter becomes clogged, you will not be getting the prescribed amount of pressure, and it could further change how the CPAP operates. COMMUNICATION  The fastest way for us to communicate with you is through [address] Are you signed up and active with this? yes  Do we have your current Email Address? yes  Would you like to give permission to receive information via email and text as well?

## 2021-07-23 NOTE — TELEPHONE ENCOUNTER
----- Message from Bettye Epley sent at 7/22/2021 10:25 AM EDT -----  Regarding: FW: Prescription Question  Contact: [phone]    ----- Message -----  From: Mikala Santiago  Sent: 7/22/2021  10:10 AM EDT  To: [facility] Nurs[facility] [place]  Subject: Prescription Question                            Good morning my Name is Desirae Wheat and I have had a recall on my Jean Carlos CPAP. And is recommend to stop using it. 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 I'm trying to see if my [facility] provider would allow me to have another CPAP machine from a different  . I would need a prescription from you to order one .  would this be something that you are authorized to do?

## 2021-07-26 DIAGNOSIS — G47.33 OSA (OBSTRUCTIVE SLEEP APNEA): Primary | ICD-10-CM

## 2021-07-26 NOTE — PROGRESS NOTES
Patient requesting new device due to Jean Carlos device recall. Order placed. Orders Placed This Encounter    AMB SUPPLY ORDER     Primary Encounter Diagnosis: Obstructive Sleep Apnea  (G47.33)    ResMed Device with Heated Humidifer [id number] / [id number]. Positive Airway Pressure Therapy: Duration of need: 99 months. Set Pressure: 5-10 cmH2O     Nasal Cushion (Replace) 2 per month.  Nasal Interface Mask 1 every 3 months.  Headgear 1 every 6 months.  Filter(s) Disposable 2 per month.  Filter(s) Non-Disposable 1 every 6 months. [facility] for [employer] (Replace) 1 every 6 months.  Tubing with heating element 1 every 3 months. Perform Mask Fitting per patient preference and comfort - replace as above. GIANA Albert-BC, [facility] NPI: [id number]  Electronically signed.  07/26/21     GIANA Albert, [facility]

## 2021-07-28 ENCOUNTER — DOCUMENTATION ONLY (OUTPATIENT)
Dept: SLEEP MEDICINE | Age: 56
End: 2021-07-28

## 2021-07-28 NOTE — PROGRESS NOTES
Patient said Stephane [facility]hawa did not have device in stock and Chaim Cortes did and wanted order be faxed to [facility] and order was faxed. Patient said he will call back to schedule 1st adh.

## 2021-10-22 ENCOUNTER — OFFICE VISIT (OUTPATIENT)
Dept: CARDIOLOGY CLINIC | Age: 56
End: 2021-10-22
Payer: COMMERCIAL

## 2021-10-22 VITALS
HEART RATE: 63 BPM | WEIGHT: 193.2 LBS | BODY MASS INDEX: 32.98 KG/M2 | DIASTOLIC BLOOD PRESSURE: 70 MMHG | HEIGHT: 64 IN | SYSTOLIC BLOOD PRESSURE: 130 MMHG | OXYGEN SATURATION: 99 %

## 2021-10-22 DIAGNOSIS — E78.00 HYPERCHOLESTEROLEMIA: ICD-10-CM

## 2021-10-22 DIAGNOSIS — I10 HYPERTENSION, UNSPECIFIED TYPE: Primary | ICD-10-CM

## 2021-10-22 PROCEDURE — 93000 ELECTROCARDIOGRAM COMPLETE: CPT | Performed by: SPECIALIST

## 2021-10-22 PROCEDURE — 99214 OFFICE O/P EST MOD 30 MIN: CPT | Performed by: SPECIALIST

## 2021-10-22 RX ORDER — ROSUVASTATIN CALCIUM 10 MG/1
10 TABLET, COATED ORAL
Qty: 15 TABLET | Refills: 7 | Status: SHIPPED | OUTPATIENT
Start: 2021-10-22

## 2021-10-22 NOTE — PROGRESS NOTES
Cheko Arrieta MD. [facility]              Patient: Per Castillo  : 1965      Today's Date: 10/22/2021          HISTORY OF PRESENT ILLNESS:     History of Present Illness:  Here for follow-up. No complaints. No CP or SOB. Tolerating meds. PAST MEDICAL HISTORY:     Past Medical History:   Diagnosis Date    High cholesterol     Hypercholesterolemia     Hypertension     Jaundice     Obesity     Statin intolerance     has tried multiple statins       Past Surgical History:   Procedure Laterality Date    HX CHOLECYSTECTOMY      HX CHOLECYSTECTOMY      HX COLONOSCOPY  2018    Dr Micheal Lake:     Current Outpatient Medications   Medication Sig Dispense Refill    ergocalciferol, vitamin D2, (VITAMIN D2 PO) Take 2,000 Units by mouth.  ezetimibe (ZETIA) 10 mg tablet Take 1 Tablet by mouth daily. 90 Tablet 1    rosuvastatin (CRESTOR) 5 mg tablet Take 1 Tab by mouth every seven (7) days. 15 Tab 6    fenofibrate (LOFIBRA) 54 mg tablet Take 1 Tab by mouth daily. Indications: high cholesterol 90 Tab 1    cpap machine kit by Does Not Apply route.  desonide (TRIDESILON) 0.05 % cream APPLY 1 APPLICATION TOPICAL TWICE A DAY,INSTR:TO AFFECTED AREA IN EAR  1    fluticasone propionate (FLONASE) 50 mcg/actuation nasal spray 2 Sprays by Both Nostrils route daily. (Patient taking differently: 2 Sprays by Both Nostrils route as needed.) 1 Bottle 1    naproxen sodium (ALEVE) 220 mg tablet Take 220 mg by mouth two (2) times daily (with meals).  IBUPROFEN (ADVIL PO) Take  by mouth.          Allergies   Allergen Reactions    Niacin Other (comments)     Headache, foggy feeling    Crestor [Rosuvastatin] Other (comments)     headache      Lipitor [Atorvastatin] Other (comments)     fatigue    Pravastatin Myalgia             SOCIAL HISTORY:     Social History     Tobacco Use    Smoking status: Former Smoker     Packs/day: 1.00     Years: 30.00     Pack years: 30.00     Quit date: 1998     Years since quittin.7    Smokeless tobacco: Never Used   Substance Use Topics    Alcohol use: Not Currently     Alcohol/week: 2.5 standard drinks     Types: 3 Cans of beer per week    Drug use: No         FAMILY HISTORY:     Family History   Problem Relation Age of Onset    Cancer Father         Non Hodgkins Lymphoma    Hypertension Brother     Elevated Lipids Brother     Dementia Mother     Other Brother         DVT - factor V leiden     Diabetes Neg Hx     Heart Disease Neg Hx           REVIEW OF SYMPTOMS:      Review of Symptoms:  Constitutional: Negative for fever, chills  HEENT: Negative for nosebleeds, tinnitus, and vision changes. Respiratory: Negative for cough, wheezing  Cardiovascular: Negative for orthopnea, claudication, syncope, and PND. Gastrointestinal: Negative for abdominal pain, diarrhea, melena. Genitourinary: Negative for dysuria  Musculoskeletal: Negative for myalgias. Skin: Negative for rash  Heme: No problems bleeding. Neurological: Negative for speech change and focal weakness.            PHYSICAL EXAM:      Physical Exam:  Visit Vitals  /70 (BP 1 Location: Left upper arm, BP Patient Position: Sitting)   Pulse 63   Ht 5' 4\" (1.626 m)   Wt 193 lb 3.2 oz (87.6 kg)   SpO2 99%   BMI 33.16 kg/m²          Patient appears generally well, mood and affect are appropriate and pleasant. HEENT:  Hearing intact, non-icteric, normocephalic, atraumatic. Neck Exam: Supple, No JVD   Lung Exam: Clear to auscultation, even breath sounds. Cardiac Exam: Regular rate and rhythm with no murmur or rub  Abdomen: Soft, non-tender  Extremities: Moves all ext well. No lower extremity edema.   Psych: Appropriate affect  Neuro - Grossly intact        LABS / OTHER STUDIES:      Lab Results   Component Value Date/Time    Sodium 140 2021 08:04 AM    Potassium 4.7 2021 08:04 AM    Chloride 104 2021 08:04 AM    CO2 25 2021 08:04 AM Anion gap 9 02/18/2012 02:36 PM    Glucose 101 (H) 04/01/2021 08:04 AM    BUN 14 04/01/2021 08:04 AM    Creatinine 1.08 04/01/2021 08:04 AM    BUN/Creatinine ratio 13 04/01/2021 08:04 AM    GFR est AA 88 04/01/2021 08:04 AM    GFR est non-AA 76 04/01/2021 08:04 AM    Calcium 9.6 04/01/2021 08:04 AM    Bilirubin, total 1.1 04/01/2021 08:04 AM    Alk.  phosphatase 54 04/01/2021 08:04 AM    Protein, total 6.7 04/01/2021 08:04 AM    Albumin 4.5 04/01/2021 08:04 AM    Globulin 3.7 02/18/2012 02:36 PM    A-G Ratio 2.0 04/01/2021 08:04 AM    ALT (SGPT) 26 04/01/2021 08:04 AM    AST (SGOT) 20 04/01/2021 08:04 AM     Lab Results   Component Value Date/Time    WBC 6.2 06/15/2020 08:22 AM    Hemoglobin (POC) 15.6 02/08/2010 12:46 PM    HGB 16.5 06/15/2020 08:22 AM    Hematocrit (POC) 46 02/08/2010 12:46 PM    HCT 50.3 06/15/2020 08:22 AM    PLATELET 661 45/87/1034 08:22 AM    MCV 87 06/15/2020 08:22 AM       Lab Results   Component Value Date/Time    Cholesterol, total 273 (H) 06/15/2020 08:22 AM    Cholesterol, Total 219 (H) 04/01/2021 08:04 AM    HDL Cholesterol 40 06/15/2020 08:22 AM    LDL, calculated 203 (H) 06/15/2020 08:22 AM    VLDL, calculated 30 06/15/2020 08:22 AM    Triglyceride 150 (H) 06/15/2020 08:22 AM     Component      Latest Ref Rng & Units 4/1/2021 10/2/2020           8:04 AM  9:11 AM   LDL-P      <1,000 nmol/L 1,742 (H) 1,690 (H)   LDL-C(NIH CALC)      0 - 99 mg/dL 146 (H) 129 (H)   HDL-C      >39 mg/dL 47 49   Triglycerides      0 - 149 mg/dL 144 123   Cholesterol, total      100 - 199 mg/dL 219 (H) 200 (H)   HDL-P (Total)      >=30.5 umol/L 32.3 35.7   Small LDL-P      <=527 nmol/L 604 (H) 824 (H)   LDL size      >20.5 nm 20.9 20.7   LP-IR SCORE      <=45 75 (H) 65 (H)               CARDIAC DIAGNOSTICS:      Cardiac Evaluation Includes:     Stress echo 2/2010 - normal study per reports     Treadmill Stress test 4/16/21 - walked 10 min (13 METS), borderline EKG changes which resolved quickly in recovery - no high risk findings      EKG 6/29/20 - NSR, normal         ASSESSMENT AND PLAN:      Assessment and Plan:  1) Familial Dyslipidemia  - LDL has been about 200 for several years   - he's had myalgias from multiple statins (pravastatin, crestor, lipitor)  - On 6/20 - discussed options - will try fibrate 48 mg and zetia 10 mg daily to get started and then increase fibrate and consider adding Crestor 5 mg once a week later   - Info given on CT heart scan to help guide therapy   - A PCSK9 Inhibitor would be effective in lower his cholesterol, however I don't think he'll meet the criteria insurance companies have set for its approval at this time. Will keep reassessing going forward   - On 10/2/20 - he is tolerating low dose fibrate and zetia -- he would like to avoid a statin for now. Check lipids. Reviewed diet and exercise. - On 4/16/21 - Lipids are much better than before ( in 2019) but still higher than goal.   He is taking Zetia and low dose fibrate - although did stop meds for a while since he had some pain in hands. He declines a PCSK9 Inhibitor. He is agreeable to add crestor 5 mg once a week. - On 10/22/21 - He is tolerating meds. He recently had labs checked at [facility]. He will try increasing to Crestor 10 mg once a week. Will consider Bempedoic acid later (his preference)   - PCP at [facility] is following labs     2) CV Risk assessment   - info given on a CT heart scan to guide therapy -- he is not interested in getting this at this time  - He would like to check a treadmill stress test for CV risk stratification --> Stress test was normal (see above)      3) BP fair - follow    4) INGRID on CPAP     5) Will see him back in 6 months.   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          Ernesto Cannon MD, [address] [facility], [address]      [address]  [address], [address] [facility], [address] [facility]  Ph: [phone]                               [phone]       ADDENDUM   10/29/2021  Labs 8/13/21 - TSH 1.1, CBC Ok, A1c 5.9, CMP OK, chol 250, , HDL 46,

## 2021-10-22 NOTE — PROGRESS NOTES
Katlyn Joyce is a 60 y.o. male    Chief Complaint   Patient presents with    Cholesterol Problem    Hypertension       Chest pain No    SOB No    Dizziness No    Swelling No    Refills No    Visit Vitals  /70 (BP 1 Location: Left upper arm, BP Patient Position: Sitting)   Pulse 63   Ht 5' 4\" (1.626 m)   Wt 193 lb 3.2 oz (87.6 kg)   SpO2 99%   BMI 33.16 kg/m²       1. Have you been to the ER, urgent care clinic since your last visit? Hospitalized since your last visit? No    2. Have you seen or consulted any other health care providers outside of the [address] [place] [address] since your last visit? Include any pap smears or colon screening.   No

## 2022-03-19 PROBLEM — Z78.9 STATIN INTOLERANCE: Status: ACTIVE | Noted: 2020-06-08

## 2024-02-04 ENCOUNTER — OFFICE VISIT (OUTPATIENT)
Age: 59
End: 2024-02-04

## 2024-02-04 VITALS
TEMPERATURE: 98.8 F | HEIGHT: 64 IN | BODY MASS INDEX: 33.32 KG/M2 | SYSTOLIC BLOOD PRESSURE: 148 MMHG | WEIGHT: 195.2 LBS | HEART RATE: 93 BPM | DIASTOLIC BLOOD PRESSURE: 72 MMHG | OXYGEN SATURATION: 96 %

## 2024-02-04 DIAGNOSIS — B96.89 ACUTE BACTERIAL BRONCHITIS: Primary | ICD-10-CM

## 2024-02-04 DIAGNOSIS — R05.1 ACUTE COUGH: ICD-10-CM

## 2024-02-04 DIAGNOSIS — R03.0 ELEVATED BLOOD PRESSURE READING: ICD-10-CM

## 2024-02-04 DIAGNOSIS — J20.8 ACUTE BACTERIAL BRONCHITIS: Primary | ICD-10-CM

## 2024-02-04 DIAGNOSIS — J02.9 SORE THROAT: ICD-10-CM

## 2024-02-04 PROBLEM — J30.9 ALLERGIC RHINITIS, UNSPECIFIED: Status: ACTIVE | Noted: 2024-02-04

## 2024-02-04 PROBLEM — H40.003 PREGLAUCOMA, UNSPECIFIED, BILATERAL: Status: ACTIVE | Noted: 2024-02-04

## 2024-02-04 PROBLEM — K11.20 SIALOADENITIS, UNSPECIFIED: Status: ACTIVE | Noted: 2024-02-04

## 2024-02-04 PROBLEM — E78.5 HYPERLIPIDEMIA: Status: ACTIVE | Noted: 2024-02-04

## 2024-02-04 PROBLEM — H90.3 SENSORINEURAL HEARING LOSS, BILATERAL: Status: ACTIVE | Noted: 2024-02-04

## 2024-02-04 PROBLEM — H52.203 UNSPECIFIED ASTIGMATISM, BILATERAL: Status: ACTIVE | Noted: 2024-02-04

## 2024-02-04 PROBLEM — R53.82 CHRONIC FATIGUE, UNSPECIFIED: Status: ACTIVE | Noted: 2024-02-04

## 2024-02-04 PROBLEM — K21.9 GASTRO-ESOPHAGEAL REFLUX DISEASE WITHOUT ESOPHAGITIS: Status: ACTIVE | Noted: 2024-02-04

## 2024-02-04 PROBLEM — G47.33 OBSTRUCTIVE SLEEP APNEA (ADULT) (PEDIATRIC): Status: ACTIVE | Noted: 2024-02-04

## 2024-02-04 PROBLEM — Z46.1 ENCOUNTER FOR FITTING AND ADJUSTMENT OF HEARING AID: Status: ACTIVE | Noted: 2024-02-04

## 2024-02-04 PROBLEM — G47.30 SLEEP APNEA: Status: ACTIVE | Noted: 2024-02-04

## 2024-02-04 PROBLEM — L57.0 ACTINIC KERATOSIS: Status: ACTIVE | Noted: 2024-02-04

## 2024-02-04 PROBLEM — H25.13 AGE-RELATED NUCLEAR CATARACT, BILATERAL: Status: ACTIVE | Noted: 2024-02-04

## 2024-02-04 PROBLEM — K63.5 POLYP OF COLON: Status: ACTIVE | Noted: 2024-02-04

## 2024-02-04 PROBLEM — R91.1 SOLITARY PULMONARY NODULE: Status: ACTIVE | Noted: 2024-02-04

## 2024-02-04 PROBLEM — Z71.89 OTHER SPECIFIED COUNSELING: Status: ACTIVE | Noted: 2024-02-04

## 2024-02-04 PROBLEM — Z12.83 ENCOUNTER FOR SCREENING FOR MALIGNANT NEOPLASM OF SKIN: Status: ACTIVE | Noted: 2024-02-04

## 2024-02-04 PROBLEM — H40.011 OPEN ANGLE WITH BORDERLINE FINDINGS, LOW RISK, RIGHT EYE: Status: ACTIVE | Noted: 2024-02-04

## 2024-02-04 PROBLEM — L90.5 SCAR CONDITIONS AND FIBROSIS OF SKIN: Status: ACTIVE | Noted: 2024-02-04

## 2024-02-04 PROBLEM — K76.0 STEATOSIS OF LIVER: Status: ACTIVE | Noted: 2024-02-04

## 2024-02-04 PROBLEM — E78.5 HYPERLIPIDEMIA, UNSPECIFIED: Status: ACTIVE | Noted: 2024-02-04

## 2024-02-04 PROBLEM — Z86.0100 PERSONAL HISTORY OF COLONIC POLYPS: Status: ACTIVE | Noted: 2024-02-04

## 2024-02-04 PROBLEM — Z86.010 PERSONAL HISTORY OF COLONIC POLYPS: Status: ACTIVE | Noted: 2024-02-04

## 2024-02-04 LAB
Lab: NORMAL
PERFORMING INSTRUMENT: NORMAL
QC PASS/FAIL: NORMAL
SARS-COV-2, POC: NORMAL
STREP PYOGENES DNA, POC: NEGATIVE
VALID INTERNAL CONTROL, POC: YES

## 2024-02-04 RX ORDER — BENZONATATE 100 MG/1
100 CAPSULE ORAL 3 TIMES DAILY PRN
Qty: 30 CAPSULE | Refills: 0 | Status: SHIPPED | OUTPATIENT
Start: 2024-02-04 | End: 2024-02-14

## 2024-02-04 RX ORDER — IBUPROFEN 200 MG
TABLET ORAL
COMMUNITY

## 2024-02-04 RX ORDER — AZITHROMYCIN 250 MG/1
250 TABLET, FILM COATED ORAL SEE ADMIN INSTRUCTIONS
Qty: 6 TABLET | Refills: 0 | Status: SHIPPED | OUTPATIENT
Start: 2024-02-04 | End: 2024-02-09

## 2024-02-04 RX ORDER — NIACINAMIDE 500 MG
500 TABLET ORAL 2 TIMES DAILY WITH MEALS
COMMUNITY

## 2024-02-04 RX ORDER — ALBUTEROL SULFATE 90 UG/1
2 AEROSOL, METERED RESPIRATORY (INHALATION) EVERY 6 HOURS PRN
Qty: 18 G | Refills: 0 | Status: SHIPPED | OUTPATIENT
Start: 2024-02-04

## 2024-02-04 RX ORDER — FAMOTIDINE 20 MG/1
20 TABLET, FILM COATED ORAL 2 TIMES DAILY
COMMUNITY

## 2024-02-04 RX ORDER — DEXTROMETHORPHAN HYDROBROMIDE AND PROMETHAZINE HYDROCHLORIDE 15; 6.25 MG/5ML; MG/5ML
5 SYRUP ORAL 4 TIMES DAILY PRN
Qty: 118 ML | Refills: 0 | Status: SHIPPED | OUTPATIENT
Start: 2024-02-04 | End: 2024-02-11

## 2024-02-04 RX ORDER — TRIAMCINOLONE ACETONIDE 1 MG/G
CREAM TOPICAL
COMMUNITY
Start: 2021-08-13

## 2024-02-04 RX ORDER — PANTOPRAZOLE SODIUM 40 MG/1
40 TABLET, DELAYED RELEASE ORAL
COMMUNITY
Start: 2023-07-18

## 2024-02-04 RX ORDER — ERGOCALCIFEROL 1.25 MG/1
2000 CAPSULE ORAL
COMMUNITY

## 2024-03-05 PROBLEM — Z12.83 ENCOUNTER FOR SCREENING FOR MALIGNANT NEOPLASM OF SKIN: Status: RESOLVED | Noted: 2024-02-04 | Resolved: 2024-03-05

## 2025-04-20 ENCOUNTER — OFFICE VISIT (OUTPATIENT)
Age: 60
End: 2025-04-20

## 2025-04-20 VITALS
DIASTOLIC BLOOD PRESSURE: 91 MMHG | BODY MASS INDEX: 32.65 KG/M2 | HEIGHT: 65 IN | TEMPERATURE: 97.5 F | WEIGHT: 196 LBS | SYSTOLIC BLOOD PRESSURE: 139 MMHG | RESPIRATION RATE: 16 BRPM | OXYGEN SATURATION: 95 % | HEART RATE: 68 BPM

## 2025-04-20 DIAGNOSIS — S99.912A INJURY OF LEFT ANKLE, INITIAL ENCOUNTER: Primary | ICD-10-CM

## 2025-04-20 DIAGNOSIS — L98.9 SYMPTOMATIC LESION OF SKIN: ICD-10-CM

## 2025-04-20 DIAGNOSIS — R03.0 ELEVATED BLOOD PRESSURE READING: ICD-10-CM

## 2025-04-20 RX ORDER — DICLOFENAC SODIUM 75 MG/1
75 TABLET, DELAYED RELEASE ORAL 2 TIMES DAILY PRN
Qty: 30 TABLET | Refills: 0 | Status: SHIPPED | OUTPATIENT
Start: 2025-04-20

## 2025-04-20 RX ORDER — CYCLOBENZAPRINE HCL 10 MG
10 TABLET ORAL 3 TIMES DAILY PRN
Qty: 21 TABLET | Refills: 0 | Status: SHIPPED | OUTPATIENT
Start: 2025-04-20 | End: 2025-04-30

## 2025-04-20 RX ORDER — IMIQUIMOD 12.5 MG/.25G
CREAM TOPICAL
Qty: 1 EACH | Refills: 0 | Status: SHIPPED | OUTPATIENT
Start: 2025-04-20 | End: 2025-04-27